# Patient Record
Sex: FEMALE | Race: OTHER | NOT HISPANIC OR LATINO | URBAN - METROPOLITAN AREA
[De-identification: names, ages, dates, MRNs, and addresses within clinical notes are randomized per-mention and may not be internally consistent; named-entity substitution may affect disease eponyms.]

---

## 2017-09-07 ENCOUNTER — EMERGENCY (EMERGENCY)
Facility: HOSPITAL | Age: 27
LOS: 1 days | Discharge: HOME | End: 2017-09-07
Attending: EMERGENCY MEDICINE
Payer: MEDICAID

## 2017-09-07 DIAGNOSIS — J02.9 ACUTE PHARYNGITIS, UNSPECIFIED: ICD-10-CM

## 2017-09-07 DIAGNOSIS — H65.93 UNSPECIFIED NONSUPPURATIVE OTITIS MEDIA, BILATERAL: ICD-10-CM

## 2017-09-07 PROCEDURE — 99284 EMERGENCY DEPT VISIT MOD MDM: CPT | Mod: 25

## 2017-09-08 VITALS
HEART RATE: 76 BPM | SYSTOLIC BLOOD PRESSURE: 125 MMHG | HEIGHT: 67 IN | RESPIRATION RATE: 18 BRPM | WEIGHT: 169.09 LBS | DIASTOLIC BLOOD PRESSURE: 78 MMHG | OXYGEN SATURATION: 100 % | TEMPERATURE: 98 F

## 2017-09-08 VITALS
HEART RATE: 72 BPM | RESPIRATION RATE: 18 BRPM | OXYGEN SATURATION: 99 % | TEMPERATURE: 99 F | SYSTOLIC BLOOD PRESSURE: 123 MMHG | DIASTOLIC BLOOD PRESSURE: 68 MMHG

## 2017-09-08 PROCEDURE — 99284 EMERGENCY DEPT VISIT MOD MDM: CPT

## 2017-09-08 RX ORDER — IBUPROFEN 200 MG
600 TABLET ORAL ONCE
Refills: 0 | Status: COMPLETED | OUTPATIENT
Start: 2017-09-08 | End: 2017-09-08

## 2017-09-08 RX ORDER — IBUPROFEN 200 MG
1 TABLET ORAL
Qty: 40 | Refills: 0
Start: 2017-09-08 | End: 2017-09-18

## 2017-09-08 RX ORDER — DEXAMETHASONE 0.5 MG/5ML
4 ELIXIR ORAL ONCE
Refills: 0 | Status: COMPLETED | OUTPATIENT
Start: 2017-09-08 | End: 2017-09-08

## 2017-09-08 RX ADMIN — Medication 600 MILLIGRAM(S): at 01:36

## 2017-09-08 RX ADMIN — Medication 1 TABLET(S): at 01:36

## 2017-09-08 RX ADMIN — Medication 4 MILLIGRAM(S): at 01:36

## 2017-09-08 NOTE — ED PROVIDER NOTE - OBJECTIVE STATEMENT
28 y/o female with no significant PMHx presents to the ED c/o bilateral throat/ear pain x 1 week. Pt notes she has been taking Amoxicillin since onset of pain for 5 days, throat got better but worsened 2 days later with painful swallowing. Pt denies fevers, cough, or any other complaints. Pt denies any sick contacts or recent travel. NKDA.

## 2017-09-08 NOTE — ED PROVIDER NOTE - MEDICAL DECISION MAKING DETAILS
28yo F presents with bl ear pain and sore throat. Exam cw OM, no evidence of strep pharyngitis. Will give augmentin, ibuprofen and decadron.

## 2017-09-08 NOTE — ED ADULT NURSE NOTE - OBJECTIVE STATEMENT
pt states that she is having sore throat since last week on anitbiotics. then her pain is back along with fever

## 2020-09-19 ENCOUNTER — RESULT REVIEW (OUTPATIENT)
Age: 30
End: 2020-09-19

## 2021-07-12 ENCOUNTER — APPOINTMENT (OUTPATIENT)
Dept: OBGYN | Facility: CLINIC | Age: 31
End: 2021-07-12
Payer: MEDICAID

## 2021-07-12 ENCOUNTER — OUTPATIENT (OUTPATIENT)
Dept: OUTPATIENT SERVICES | Facility: HOSPITAL | Age: 31
LOS: 1 days | End: 2021-07-12
Payer: COMMERCIAL

## 2021-07-12 VITALS
RESPIRATION RATE: 18 BRPM | OXYGEN SATURATION: 99 % | WEIGHT: 173 LBS | TEMPERATURE: 98.1 F | BODY MASS INDEX: 26.22 KG/M2 | DIASTOLIC BLOOD PRESSURE: 80 MMHG | HEART RATE: 67 BPM | HEIGHT: 68 IN | SYSTOLIC BLOOD PRESSURE: 123 MMHG

## 2021-07-12 DIAGNOSIS — Z00.00 ENCOUNTER FOR GENERAL ADULT MEDICAL EXAMINATION WITHOUT ABNORMAL FINDINGS: ICD-10-CM

## 2021-07-12 DIAGNOSIS — M25.561 PAIN IN RIGHT KNEE: ICD-10-CM

## 2021-07-12 DIAGNOSIS — M79.671 PAIN IN RIGHT FOOT: ICD-10-CM

## 2021-07-12 DIAGNOSIS — Z78.9 OTHER SPECIFIED HEALTH STATUS: ICD-10-CM

## 2021-07-12 DIAGNOSIS — Z83.3 FAMILY HISTORY OF DIABETES MELLITUS: ICD-10-CM

## 2021-07-12 DIAGNOSIS — M79.672 PAIN IN RIGHT FOOT: ICD-10-CM

## 2021-07-12 DIAGNOSIS — M25.562 PAIN IN RIGHT KNEE: ICD-10-CM

## 2021-07-12 PROCEDURE — 36415 COLL VENOUS BLD VENIPUNCTURE: CPT

## 2021-07-12 PROCEDURE — G0463: CPT

## 2021-07-12 PROCEDURE — 36415 COLL VENOUS BLD VENIPUNCTURE: CPT | Mod: NC

## 2021-07-12 PROCEDURE — 84146 ASSAY OF PROLACTIN: CPT

## 2021-07-12 PROCEDURE — 86803 HEPATITIS C AB TEST: CPT

## 2021-07-12 PROCEDURE — 87625 HPV TYPES 16 & 18 ONLY: CPT

## 2021-07-12 PROCEDURE — 83001 ASSAY OF GONADOTROPIN (FSH): CPT

## 2021-07-12 PROCEDURE — 83002 ASSAY OF GONADOTROPIN (LH): CPT

## 2021-07-12 PROCEDURE — 84443 ASSAY THYROID STIM HORMONE: CPT

## 2021-07-12 PROCEDURE — 87624 HPV HI-RISK TYP POOLED RSLT: CPT

## 2021-07-12 PROCEDURE — 86780 TREPONEMA PALLIDUM: CPT

## 2021-07-12 PROCEDURE — 87340 HEPATITIS B SURFACE AG IA: CPT

## 2021-07-12 PROCEDURE — 99203 OFFICE O/P NEW LOW 30 MIN: CPT | Mod: NC,25

## 2021-07-12 PROCEDURE — 87491 CHLMYD TRACH DNA AMP PROBE: CPT

## 2021-07-12 PROCEDURE — 87800 DETECT AGNT MULT DNA DIREC: CPT

## 2021-07-12 PROCEDURE — 84439 ASSAY OF FREE THYROXINE: CPT

## 2021-07-12 PROCEDURE — 87591 N.GONORRHOEAE DNA AMP PROB: CPT

## 2021-07-12 PROCEDURE — 87389 HIV-1 AG W/HIV-1&-2 AB AG IA: CPT

## 2021-07-12 RX ORDER — PRENATAL VIT 108/IRON,CB/FOLIC 30 MG-1 MG
30-1 TABLET ORAL
Qty: 30 | Refills: 5 | Status: ACTIVE | COMMUNITY
Start: 2021-07-12 | End: 1900-01-01

## 2021-07-12 NOTE — COUNSELING
[Vitamins/Supplements] : vitamins/supplements [Preconception Care/ Fertility options] : preconception care, fertility options [STD (testing, results, tx)] : STD (testing, results, tx)

## 2021-07-12 NOTE — HISTORY OF PRESENT ILLNESS
[N] : Patient does not use contraception [Y] : Patient is sexually active [FreeTextEntry1] : Patient complains of vaginal itching beige vaginal discharge with foul odor  [Patient reported PAP Smear was normal] : Patient reported PAP Smear was normal [TextBox_4] : Here for reported vaginal odor prior to her menses\par LMP  -\par reports regular cycles\par trying to conceive x 7 months, , Hx missed ab x2, 1 prior to 1st  pregnancy @ 8 wks, second was after her 2nd child\par reports cholecystectomy 2 months ago [PapSmeardate] : 2020 patient [TextBox_31] : no HPV seen [LMPDate] : 7/5/21 [PGHxTotal] : 4 [[facility]] : 2 [PGHxPremature] : 0 [PGHxAbortions] : 0 [[facility]Living] : 2 [PGHxABInduced] : 0 [PGHxABSpont] : 2 [PGHxEctopic] : 0 [PGHxMultBirths] : 0 [No] : Patient does not have concerns regarding sex [Currently Active] : currently active [Men] : men [Vaginal] : vaginal

## 2021-07-13 DIAGNOSIS — Z01.419 ENCOUNTER FOR GYNECOLOGICAL EXAMINATION (GENERAL) (ROUTINE) WITHOUT ABNORMAL FINDINGS: ICD-10-CM

## 2021-07-14 LAB
C TRACH RRNA SPEC QL NAA+PROBE: NOT DETECTED
CANDIDA VAG CYTO: NOT DETECTED
FSH SERPL-MCNC: 5 IU/L
G VAGINALIS+PREV SP MTYP VAG QL MICRO: DETECTED
HBV SURFACE AG SER QL: NONREACTIVE
HCV AB SER QL: NONREACTIVE
HCV S/CO RATIO: 0.16 S/CO
HIV1+2 AB SPEC QL IA.RAPID: NONREACTIVE
HPV HIGH+LOW RISK DNA PNL CVX: NOT DETECTED
LH SERPL-ACNC: 4.9 IU/L
N GONORRHOEA RRNA SPEC QL NAA+PROBE: NOT DETECTED
PROLACTIN SERPL-MCNC: 9.5 NG/ML
SOURCE TP AMPLIFICATION: NORMAL
T PALLIDUM AB SER QL IA: NEGATIVE
T VAGINALIS VAG QL WET PREP: NOT DETECTED
T4 FREE SERPL-MCNC: 1.1 NG/DL
TSH SERPL-ACNC: 6.14 UIU/ML

## 2021-07-14 RX ORDER — METRONIDAZOLE 7.5 MG/G
0.75 GEL VAGINAL
Qty: 1 | Refills: 0 | Status: ACTIVE | COMMUNITY
Start: 2021-07-14 | End: 1900-01-01

## 2021-07-15 ENCOUNTER — NON-APPOINTMENT (OUTPATIENT)
Age: 31
End: 2021-07-15

## 2021-07-15 LAB
HPV 16 E6+E7 MRNA CVX QL NAA+PROBE: NOT DETECTED
HPV18+45 E6+E7 MRNA CVX QL NAA+PROBE: NOT DETECTED

## 2021-07-19 LAB — CYTOLOGY CVX/VAG DOC THIN PREP: ABNORMAL

## 2021-08-02 ENCOUNTER — APPOINTMENT (OUTPATIENT)
Dept: OBGYN | Facility: CLINIC | Age: 31
End: 2021-08-02

## 2021-10-04 NOTE — ED ADULT NURSE NOTE - NS ED NURSE DC INFO COMPLEXITY
[de-identified] : fever X 4 days, coughing, nasal congestion,bilateral ear pain X 2 days [FreeTextEntry6] : afebrile now\par lots of enery, no fatigue or lethary or SOB\par clear rhinorrhea, cough, using OTC cough meds\par denies covid exposure, vomiting or diarrhea\par at home with mom. no sick contacts Simple: Patient demonstrates quick and easy understanding/Verbalized Understanding

## 2022-03-16 ENCOUNTER — APPOINTMENT (OUTPATIENT)
Dept: OBGYN | Facility: CLINIC | Age: 32
End: 2022-03-16
Payer: MEDICAID

## 2022-03-16 ENCOUNTER — OUTPATIENT (OUTPATIENT)
Dept: OUTPATIENT SERVICES | Facility: HOSPITAL | Age: 32
LOS: 1 days | End: 2022-03-16
Payer: COMMERCIAL

## 2022-03-16 VITALS
HEART RATE: 65 BPM | OXYGEN SATURATION: 100 % | HEIGHT: 68 IN | RESPIRATION RATE: 18 BRPM | TEMPERATURE: 98.2 F | BODY MASS INDEX: 25.91 KG/M2 | DIASTOLIC BLOOD PRESSURE: 70 MMHG | WEIGHT: 171 LBS | SYSTOLIC BLOOD PRESSURE: 107 MMHG

## 2022-03-16 DIAGNOSIS — Z34.00 ENCOUNTER FOR SUPERVISION OF NORMAL FIRST PREGNANCY, UNSPECIFIED TRIMESTER: ICD-10-CM

## 2022-03-16 DIAGNOSIS — Z3A.01 LESS THAN 8 WEEKS GESTATION OF PREGNANCY: ICD-10-CM

## 2022-03-16 DIAGNOSIS — Z86.32 PERSONAL HISTORY OF GESTATIONAL DIABETES: ICD-10-CM

## 2022-03-16 DIAGNOSIS — Z82.49 FAMILY HISTORY OF ISCHEMIC HEART DISEASE AND OTHER DISEASES OF THE CIRCULATORY SYSTEM: ICD-10-CM

## 2022-03-16 PROCEDURE — 81243 FMR1 GEN ALY DETC ABNL ALLEL: CPT

## 2022-03-16 PROCEDURE — 87086 URINE CULTURE/COLONY COUNT: CPT

## 2022-03-16 PROCEDURE — 83655 ASSAY OF LEAD: CPT

## 2022-03-16 PROCEDURE — 85025 COMPLETE CBC W/AUTO DIFF WBC: CPT

## 2022-03-16 PROCEDURE — 86850 RBC ANTIBODY SCREEN: CPT

## 2022-03-16 PROCEDURE — 80053 COMPREHEN METABOLIC PANEL: CPT

## 2022-03-16 PROCEDURE — 83020 HEMOGLOBIN ELECTROPHORESIS: CPT

## 2022-03-16 PROCEDURE — 36415 COLL VENOUS BLD VENIPUNCTURE: CPT | Mod: NC

## 2022-03-16 PROCEDURE — 81025 URINE PREGNANCY TEST: CPT

## 2022-03-16 PROCEDURE — 86780 TREPONEMA PALLIDUM: CPT

## 2022-03-16 PROCEDURE — 86900 BLOOD TYPING SEROLOGIC ABO: CPT

## 2022-03-16 PROCEDURE — 87340 HEPATITIS B SURFACE AG IA: CPT

## 2022-03-16 PROCEDURE — 86765 RUBEOLA ANTIBODY: CPT

## 2022-03-16 PROCEDURE — 86481 TB AG RESPONSE T-CELL SUSP: CPT

## 2022-03-16 PROCEDURE — 99203 OFFICE O/P NEW LOW 30 MIN: CPT | Mod: NC,25

## 2022-03-16 PROCEDURE — 86762 RUBELLA ANTIBODY: CPT

## 2022-03-16 PROCEDURE — G0463: CPT

## 2022-03-16 PROCEDURE — 85730 THROMBOPLASTIN TIME PARTIAL: CPT

## 2022-03-16 PROCEDURE — 86803 HEPATITIS C AB TEST: CPT

## 2022-03-16 PROCEDURE — 36415 COLL VENOUS BLD VENIPUNCTURE: CPT

## 2022-03-16 PROCEDURE — 84443 ASSAY THYROID STIM HORMONE: CPT

## 2022-03-16 PROCEDURE — 83036 HEMOGLOBIN GLYCOSYLATED A1C: CPT

## 2022-03-16 PROCEDURE — 81220 CFTR GENE COM VARIANTS: CPT

## 2022-03-16 PROCEDURE — 86787 VARICELLA-ZOSTER ANTIBODY: CPT

## 2022-03-16 PROCEDURE — 86901 BLOOD TYPING SEROLOGIC RH(D): CPT

## 2022-03-16 PROCEDURE — 81329 SMN1 GENE DOS/DELETION ALYS: CPT

## 2022-03-16 PROCEDURE — 84550 ASSAY OF BLOOD/URIC ACID: CPT

## 2022-03-16 PROCEDURE — 85384 FIBRINOGEN ACTIVITY: CPT

## 2022-03-16 PROCEDURE — 84156 ASSAY OF PROTEIN URINE: CPT

## 2022-03-16 PROCEDURE — 87389 HIV-1 AG W/HIV-1&-2 AB AG IA: CPT

## 2022-03-16 PROCEDURE — 81003 URINALYSIS AUTO W/O SCOPE: CPT

## 2022-03-16 RX ORDER — PNV NO.95/FERROUS FUM/FOLIC AC 28MG-0.8MG
TABLET ORAL
Refills: 0 | Status: ACTIVE | COMMUNITY

## 2022-03-17 DIAGNOSIS — Z3A.01 LESS THAN 8 WEEKS GESTATION OF PREGNANCY: ICD-10-CM

## 2022-03-17 DIAGNOSIS — Z34.90 ENCOUNTER FOR SUPERVISION OF NORMAL PREGNANCY, UNSPECIFIED, UNSPECIFIED TRIMESTER: ICD-10-CM

## 2022-03-17 LAB
ABO + RH PNL BLD: NORMAL
ALBUMIN SERPL ELPH-MCNC: 4.7 G/DL
ALP BLD-CCNC: 77 U/L
ALT SERPL-CCNC: 14 U/L
ANION GAP SERPL CALC-SCNC: 19 MMOL/L
APTT BLD: 26.7 SEC
AST SERPL-CCNC: 14 U/L
BASOPHILS # BLD AUTO: 0.03 K/UL
BASOPHILS NFR BLD AUTO: 0.7 %
BILIRUB SERPL-MCNC: 0.6 MG/DL
BLD GP AB SCN SERPL QL: NORMAL
BUN SERPL-MCNC: 9 MG/DL
C TRACH RRNA SPEC QL NAA+PROBE: NOT DETECTED
CALCIUM SERPL-MCNC: 9.3 MG/DL
CHLORIDE SERPL-SCNC: 106 MMOL/L
CO2 SERPL-SCNC: 16 MMOL/L
CREAT SERPL-MCNC: 0.74 MG/DL
CREAT SPEC-SCNC: 30 MG/DL
CREAT/PROT UR: NORMAL RATIO
EGFR: 109 ML/MIN/1.73M2
EOSINOPHIL # BLD AUTO: 0.04 K/UL
EOSINOPHIL NFR BLD AUTO: 0.9 %
ESTIMATED AVERAGE GLUCOSE: 103 MG/DL
FIBRINOGEN PPP COAG.DERIVED-MCNC: 472 MG/DL
GLUCOSE SERPL-MCNC: 105 MG/DL
HBA1C MFR BLD HPLC: 5.2 %
HBV SURFACE AG SER QL: NONREACTIVE
HCT VFR BLD CALC: 37.6 %
HCV AB SER QL: NONREACTIVE
HCV S/CO RATIO: 0.17 S/CO
HGB A MFR BLD: 97.2 %
HGB A2 MFR BLD: 2.8 %
HGB BLD-MCNC: 13 G/DL
HGB FRACT BLD-IMP: NORMAL
HIV1+2 AB SPEC QL IA.RAPID: NONREACTIVE
IMM GRANULOCYTES NFR BLD AUTO: 0.2 %
LYMPHOCYTES # BLD AUTO: 1.84 K/UL
LYMPHOCYTES NFR BLD AUTO: 41 %
MAN DIFF?: NORMAL
MCHC RBC-ENTMCNC: 30.7 PG
MCHC RBC-ENTMCNC: 34.6 GM/DL
MCV RBC AUTO: 88.9 FL
MEV IGG FLD QL IA: >300 AU/ML
MEV IGG+IGM SER-IMP: POSITIVE
MONOCYTES # BLD AUTO: 0.28 K/UL
MONOCYTES NFR BLD AUTO: 6.2 %
N GONORRHOEA RRNA SPEC QL NAA+PROBE: NOT DETECTED
NEUTROPHILS # BLD AUTO: 2.29 K/UL
NEUTROPHILS NFR BLD AUTO: 51 %
PLATELET # BLD AUTO: 258 K/UL
POTASSIUM SERPL-SCNC: 3.6 MMOL/L
PROT SERPL-MCNC: 7.1 G/DL
PROT UR-MCNC: <4 MG/DL
RBC # BLD: 4.23 M/UL
RBC # FLD: 13.2 %
RUBV IGG FLD-ACNC: 3 INDEX
RUBV IGG SER-IMP: POSITIVE
SODIUM SERPL-SCNC: 140 MMOL/L
SOURCE AMPLIFICATION: NORMAL
T PALLIDUM AB SER QL IA: NEGATIVE
URATE SERPL-MCNC: 4.5 MG/DL
VZV AB TITR SER: POSITIVE
VZV IGG SER IF-ACNC: 647.3 INDEX
WBC # FLD AUTO: 4.49 K/UL

## 2022-03-18 LAB
BACTERIA UR CULT: NORMAL
LEAD BLD-MCNC: <1 UG/DL
M TB IFN-G BLD-IMP: NEGATIVE
QUANTIFERON TB PLUS MITOGEN MINUS NIL: 9.85 IU/ML
QUANTIFERON TB PLUS NIL: 0.15 IU/ML
QUANTIFERON TB PLUS TB1 MINUS NIL: 0.08 IU/ML
QUANTIFERON TB PLUS TB2 MINUS NIL: 0.04 IU/ML

## 2022-03-23 LAB — FMR1 GENE MUT ANL BLD/T: NORMAL

## 2022-03-24 LAB — CFTR MUT TESTED BLD/T: NEGATIVE

## 2022-03-29 LAB — AR GENE MUT ANL BLD/T: NORMAL

## 2022-03-31 ENCOUNTER — NON-APPOINTMENT (OUTPATIENT)
Age: 32
End: 2022-03-31

## 2022-04-01 ENCOUNTER — OUTPATIENT (OUTPATIENT)
Dept: OUTPATIENT SERVICES | Facility: HOSPITAL | Age: 32
LOS: 1 days | End: 2022-04-01
Payer: COMMERCIAL

## 2022-04-01 DIAGNOSIS — R79.89 OTHER SPECIFIED ABNORMAL FINDINGS OF BLOOD CHEMISTRY: ICD-10-CM

## 2022-04-01 DIAGNOSIS — Z34.90 ENCOUNTER FOR SUPERVISION OF NORMAL PREGNANCY, UNSPECIFIED, UNSPECIFIED TRIMESTER: ICD-10-CM

## 2022-04-01 PROCEDURE — 84445 ASSAY OF TSI GLOBULIN: CPT

## 2022-04-01 PROCEDURE — 84439 ASSAY OF FREE THYROXINE: CPT

## 2022-04-01 PROCEDURE — 83520 IMMUNOASSAY QUANT NOS NONAB: CPT

## 2022-04-01 PROCEDURE — 86376 MICROSOMAL ANTIBODY EACH: CPT

## 2022-04-01 PROCEDURE — 84443 ASSAY THYROID STIM HORMONE: CPT

## 2022-04-04 LAB
T4 FREE SERPL-MCNC: 1.1 NG/DL
THYROPEROXIDASE AB SERPL IA-ACNC: <10 IU/ML
TSH RECEPTOR AB: <1.1 IU/L
TSH SERPL-ACNC: 5.19 UIU/ML

## 2022-04-06 ENCOUNTER — NON-APPOINTMENT (OUTPATIENT)
Age: 32
End: 2022-04-06

## 2022-04-06 LAB — TSH SERPL-ACNC: 5.23 UIU/ML

## 2022-04-21 ENCOUNTER — NON-APPOINTMENT (OUTPATIENT)
Age: 32
End: 2022-04-21

## 2022-04-21 ENCOUNTER — APPOINTMENT (OUTPATIENT)
Dept: OBGYN | Facility: CLINIC | Age: 32
End: 2022-04-21
Payer: MEDICAID

## 2022-04-21 ENCOUNTER — ASOB RESULT (OUTPATIENT)
Age: 32
End: 2022-04-21

## 2022-04-21 ENCOUNTER — OUTPATIENT (OUTPATIENT)
Dept: OUTPATIENT SERVICES | Facility: HOSPITAL | Age: 32
LOS: 1 days | End: 2022-04-21
Payer: COMMERCIAL

## 2022-04-21 ENCOUNTER — APPOINTMENT (OUTPATIENT)
Dept: ANTEPARTUM | Facility: CLINIC | Age: 32
End: 2022-04-21
Payer: MEDICAID

## 2022-04-21 VITALS
TEMPERATURE: 97.7 F | BODY MASS INDEX: 25.61 KG/M2 | SYSTOLIC BLOOD PRESSURE: 118 MMHG | HEART RATE: 70 BPM | OXYGEN SATURATION: 99 % | DIASTOLIC BLOOD PRESSURE: 70 MMHG | HEIGHT: 68 IN | WEIGHT: 169 LBS

## 2022-04-21 DIAGNOSIS — Z34.00 ENCOUNTER FOR SUPERVISION OF NORMAL FIRST PREGNANCY, UNSPECIFIED TRIMESTER: ICD-10-CM

## 2022-04-21 LAB — TSI ACT/NOR SER: <0.1 IU/L

## 2022-04-21 PROCEDURE — 81003 URINALYSIS AUTO W/O SCOPE: CPT

## 2022-04-21 PROCEDURE — G0463: CPT

## 2022-04-21 PROCEDURE — 99213 OFFICE O/P EST LOW 20 MIN: CPT | Mod: NC,25

## 2022-04-21 PROCEDURE — 76813 OB US NUCHAL MEAS 1 GEST: CPT | Mod: 59

## 2022-04-21 PROCEDURE — 76801 OB US < 14 WKS SINGLE FETUS: CPT

## 2022-04-21 RX ORDER — ASPIRIN 81 MG/1
81 TABLET, CHEWABLE ORAL DAILY
Qty: 1 | Refills: 6 | Status: ACTIVE | COMMUNITY
Start: 2022-04-21 | End: 1900-01-01

## 2022-04-28 DIAGNOSIS — Z34.91 ENCOUNTER FOR SUPERVISION OF NORMAL PREGNANCY, UNSPECIFIED, FIRST TRIMESTER: ICD-10-CM

## 2022-04-28 DIAGNOSIS — Z3A.12 12 WEEKS GESTATION OF PREGNANCY: ICD-10-CM

## 2022-05-18 ENCOUNTER — APPOINTMENT (OUTPATIENT)
Dept: OBGYN | Facility: CLINIC | Age: 32
End: 2022-05-18
Payer: MEDICAID

## 2022-05-18 ENCOUNTER — OUTPATIENT (OUTPATIENT)
Dept: OUTPATIENT SERVICES | Facility: HOSPITAL | Age: 32
LOS: 1 days | End: 2022-05-18
Payer: COMMERCIAL

## 2022-05-18 VITALS
HEIGHT: 68 IN | TEMPERATURE: 97.4 F | HEART RATE: 85 BPM | WEIGHT: 170 LBS | BODY MASS INDEX: 25.76 KG/M2 | DIASTOLIC BLOOD PRESSURE: 82 MMHG | RESPIRATION RATE: 18 BRPM | SYSTOLIC BLOOD PRESSURE: 122 MMHG | OXYGEN SATURATION: 100 %

## 2022-05-18 DIAGNOSIS — R05.9 COUGH, UNSPECIFIED: ICD-10-CM

## 2022-05-18 DIAGNOSIS — J30.89 OTHER ALLERGIC RHINITIS: ICD-10-CM

## 2022-05-18 DIAGNOSIS — Z34.00 ENCOUNTER FOR SUPERVISION OF NORMAL FIRST PREGNANCY, UNSPECIFIED TRIMESTER: ICD-10-CM

## 2022-05-18 DIAGNOSIS — Z34.92 ENCOUNTER FOR SUPERVISION OF NORMAL PREGNANCY, UNSPECIFIED, SECOND TRIMESTER: ICD-10-CM

## 2022-05-18 DIAGNOSIS — Z3A.16 16 WEEKS GESTATION OF PREGNANCY: ICD-10-CM

## 2022-05-18 PROCEDURE — 36415 COLL VENOUS BLD VENIPUNCTURE: CPT | Mod: NC

## 2022-05-18 PROCEDURE — 99213 OFFICE O/P EST LOW 20 MIN: CPT | Mod: NC,25

## 2022-05-18 PROCEDURE — 87651 STREP A DNA AMP PROBE: CPT

## 2022-05-18 PROCEDURE — U0005: CPT

## 2022-05-18 PROCEDURE — 85025 COMPLETE CBC W/AUTO DIFF WBC: CPT

## 2022-05-18 PROCEDURE — 87086 URINE CULTURE/COLONY COUNT: CPT

## 2022-05-18 PROCEDURE — U0003: CPT

## 2022-05-18 PROCEDURE — G0463: CPT

## 2022-05-18 PROCEDURE — 82950 GLUCOSE TEST: CPT

## 2022-05-18 PROCEDURE — 81003 URINALYSIS AUTO W/O SCOPE: CPT

## 2022-05-18 PROCEDURE — 86336 INHIBIN A: CPT

## 2022-05-18 PROCEDURE — 84443 ASSAY THYROID STIM HORMONE: CPT

## 2022-05-18 PROCEDURE — 36415 COLL VENOUS BLD VENIPUNCTURE: CPT

## 2022-05-18 RX ORDER — FLUTICASONE PROPIONATE 50 UG/1
50 SPRAY, METERED NASAL TWICE DAILY
Qty: 1 | Refills: 0 | Status: ACTIVE | COMMUNITY
Start: 2022-05-18 | End: 1900-01-01

## 2022-05-18 RX ORDER — OXYMETAZOLINE HCL 0.05 %
0.05 SPRAY, NON-AEROSOL (ML) NASAL TWICE DAILY
Qty: 1 | Refills: 0 | Status: ACTIVE | COMMUNITY
Start: 2022-05-18 | End: 1900-01-01

## 2022-05-18 RX ORDER — GUAIFENESIN 100 MG/5ML
100 LIQUID ORAL EVERY 4 HOURS
Qty: 1 | Refills: 2 | Status: ACTIVE | COMMUNITY
Start: 2022-05-18 | End: 1900-01-01

## 2022-05-19 LAB
BASOPHILS # BLD AUTO: 0.03 K/UL
BASOPHILS NFR BLD AUTO: 0.6 %
EOSINOPHIL # BLD AUTO: 0.17 K/UL
EOSINOPHIL NFR BLD AUTO: 3.4 %
GLUCOSE 1H P 50 G GLC PO SERPL-MCNC: 272 MG/DL
HCT VFR BLD CALC: 35.1 %
HGB BLD-MCNC: 12.2 G/DL
IMM GRANULOCYTES NFR BLD AUTO: 0.6 %
LYMPHOCYTES # BLD AUTO: 1.28 K/UL
LYMPHOCYTES NFR BLD AUTO: 25.5 %
MAN DIFF?: NORMAL
MCHC RBC-ENTMCNC: 31.7 PG
MCHC RBC-ENTMCNC: 34.8 GM/DL
MCV RBC AUTO: 91.2 FL
MONOCYTES # BLD AUTO: 0.23 K/UL
MONOCYTES NFR BLD AUTO: 4.6 %
NEUTROPHILS # BLD AUTO: 3.27 K/UL
NEUTROPHILS NFR BLD AUTO: 65.3 %
PLATELET # BLD AUTO: 233 K/UL
RBC # BLD: 3.85 M/UL
RBC # FLD: 13.8 %
S PYO DNA THROAT QL NAA+PROBE: NOT DETECTED
SARS-COV-2 N GENE NPH QL NAA+PROBE: NOT DETECTED
TSH SERPL-ACNC: 3.2 UIU/ML
WBC # FLD AUTO: 5.01 K/UL

## 2022-05-20 ENCOUNTER — NON-APPOINTMENT (OUTPATIENT)
Age: 32
End: 2022-05-20

## 2022-05-24 ENCOUNTER — RX RENEWAL (OUTPATIENT)
Age: 32
End: 2022-05-24

## 2022-05-24 LAB — BACTERIA UR CULT: NORMAL

## 2022-05-26 LAB
1ST TRIMESTER DATA: NORMAL
2ND TRIMESTER DATA: NORMAL
ADDENDUM DOC: NORMAL
AFP PNL SERPL: NORMAL
AFP SERPL-ACNC: NORMAL
AFP SERPL-ACNC: NORMAL
B-HCG FREE SERPL-MCNC: NORMAL
CLINICAL BIOCHEMIST REVIEW: NORMAL
FREE BETA HCG 1ST TRIMESTER: NORMAL
INHIBIN A SERPL-MCNC: NORMAL
NOTES NTD: NORMAL
NT: NORMAL
PAPP-A SERPL-ACNC: NORMAL
U ESTRIOL SERPL-SCNC: NORMAL

## 2022-06-13 ENCOUNTER — ASOB RESULT (OUTPATIENT)
Age: 32
End: 2022-06-13

## 2022-06-13 ENCOUNTER — APPOINTMENT (OUTPATIENT)
Dept: MATERNAL FETAL MEDICINE | Facility: CLINIC | Age: 32
End: 2022-06-13
Payer: MEDICAID

## 2022-06-13 PROCEDURE — G0109 DIAB MANAGE TRN IND/GROUP: CPT | Mod: 95

## 2022-06-13 RX ORDER — URINE ACETONE TEST STRIPS
STRIP MISCELLANEOUS
Qty: 50 | Refills: 2 | Status: ACTIVE | COMMUNITY
Start: 2022-06-13 | End: 1900-01-01

## 2022-06-13 RX ORDER — BLOOD-GLUCOSE METER
W/DEVICE KIT MISCELLANEOUS
Qty: 1 | Refills: 0 | Status: ACTIVE | COMMUNITY
Start: 2022-06-13 | End: 1900-01-01

## 2022-06-16 ENCOUNTER — APPOINTMENT (OUTPATIENT)
Dept: ANTEPARTUM | Facility: CLINIC | Age: 32
End: 2022-06-16
Payer: MEDICAID

## 2022-06-16 ENCOUNTER — ASOB RESULT (OUTPATIENT)
Age: 32
End: 2022-06-16

## 2022-06-16 ENCOUNTER — OUTPATIENT (OUTPATIENT)
Dept: OUTPATIENT SERVICES | Facility: HOSPITAL | Age: 32
LOS: 1 days | End: 2022-06-16
Payer: COMMERCIAL

## 2022-06-16 ENCOUNTER — APPOINTMENT (OUTPATIENT)
Dept: OBGYN | Facility: CLINIC | Age: 32
End: 2022-06-16
Payer: MEDICAID

## 2022-06-16 VITALS
DIASTOLIC BLOOD PRESSURE: 78 MMHG | WEIGHT: 178 LBS | HEIGHT: 68 IN | BODY MASS INDEX: 26.98 KG/M2 | SYSTOLIC BLOOD PRESSURE: 124 MMHG | TEMPERATURE: 97.7 F | RESPIRATION RATE: 18 BRPM | OXYGEN SATURATION: 100 % | HEART RATE: 74 BPM

## 2022-06-16 DIAGNOSIS — Z01.419 ENCOUNTER FOR GYNECOLOGICAL EXAMINATION (GENERAL) (ROUTINE) W/OUT ABNORMAL FINDINGS: ICD-10-CM

## 2022-06-16 DIAGNOSIS — Z34.00 ENCOUNTER FOR SUPERVISION OF NORMAL FIRST PREGNANCY, UNSPECIFIED TRIMESTER: ICD-10-CM

## 2022-06-16 DIAGNOSIS — O24.319 UNSPECIFIED PRE-EXISTING DIABETES MELLITUS IN PREGNANCY, UNSPECIFIED TRIMESTER: ICD-10-CM

## 2022-06-16 PROCEDURE — 76811 OB US DETAILED SNGL FETUS: CPT

## 2022-06-16 PROCEDURE — 81003 URINALYSIS AUTO W/O SCOPE: CPT

## 2022-06-16 PROCEDURE — G0463: CPT

## 2022-06-16 PROCEDURE — 87086 URINE CULTURE/COLONY COUNT: CPT

## 2022-06-16 PROCEDURE — 99213 OFFICE O/P EST LOW 20 MIN: CPT

## 2022-06-16 RX ORDER — BLOOD SUGAR DIAGNOSTIC
STRIP MISCELLANEOUS 4 TIMES DAILY
Qty: 1 | Refills: 1 | Status: ACTIVE | COMMUNITY
Start: 2022-05-19 | End: 1900-01-01

## 2022-06-16 RX ORDER — BLOOD-GLUCOSE METER
KIT MISCELLANEOUS
Qty: 1 | Refills: 0 | Status: ACTIVE | COMMUNITY
Start: 2022-05-19 | End: 1900-01-01

## 2022-06-16 RX ORDER — LANCETS 28 GAUGE
EACH MISCELLANEOUS
Qty: 120 | Refills: 4 | Status: ACTIVE | COMMUNITY
Start: 2022-05-19 | End: 1900-01-01

## 2022-06-21 DIAGNOSIS — O26.899 OTHER SPECIFIED PREGNANCY RELATED CONDITIONS, UNSPECIFIED TRIMESTER: ICD-10-CM

## 2022-06-21 DIAGNOSIS — Z34.90 ENCOUNTER FOR SUPERVISION OF NORMAL PREGNANCY, UNSPECIFIED, UNSPECIFIED TRIMESTER: ICD-10-CM

## 2022-06-21 DIAGNOSIS — O24.419 GESTATIONAL DIABETES MELLITUS IN PREGNANCY, UNSPECIFIED CONTROL: ICD-10-CM

## 2022-06-21 DIAGNOSIS — O09.299 SUPERVISION OF PREGNANCY WITH OTHER POOR REPRODUCTIVE OR OBSTETRIC HISTORY, UNSPECIFIED TRIMESTER: ICD-10-CM

## 2022-06-21 LAB — BACTERIA UR CULT: NORMAL

## 2022-06-22 ENCOUNTER — TRANSCRIPTION ENCOUNTER (OUTPATIENT)
Age: 32
End: 2022-06-22

## 2022-06-22 ENCOUNTER — ASOB RESULT (OUTPATIENT)
Age: 32
End: 2022-06-22

## 2022-06-22 ENCOUNTER — APPOINTMENT (OUTPATIENT)
Dept: MATERNAL FETAL MEDICINE | Facility: CLINIC | Age: 32
End: 2022-06-22
Payer: MEDICAID

## 2022-06-22 PROCEDURE — G0108 DIAB MANAGE TRN  PER INDIV: CPT | Mod: 95

## 2022-06-22 RX ORDER — ISOPROPYL ALCOHOL 0.7 ML/ML
SWAB TOPICAL
Qty: 1 | Refills: 2 | Status: ACTIVE | COMMUNITY
Start: 2022-06-22 | End: 1900-01-01

## 2022-06-27 ENCOUNTER — NON-APPOINTMENT (OUTPATIENT)
Age: 32
End: 2022-06-27

## 2022-06-28 ENCOUNTER — APPOINTMENT (OUTPATIENT)
Dept: MATERNAL FETAL MEDICINE | Facility: CLINIC | Age: 32
End: 2022-06-28

## 2022-06-28 ENCOUNTER — ASOB RESULT (OUTPATIENT)
Age: 32
End: 2022-06-28

## 2022-06-28 PROCEDURE — G0108 DIAB MANAGE TRN  PER INDIV: CPT | Mod: 95

## 2022-07-07 ENCOUNTER — ASOB RESULT (OUTPATIENT)
Age: 32
End: 2022-07-07

## 2022-07-07 ENCOUNTER — APPOINTMENT (OUTPATIENT)
Dept: MATERNAL FETAL MEDICINE | Facility: CLINIC | Age: 32
End: 2022-07-07

## 2022-07-07 PROCEDURE — G0108 DIAB MANAGE TRN  PER INDIV: CPT | Mod: 95

## 2022-07-08 ENCOUNTER — APPOINTMENT (OUTPATIENT)
Dept: PEDIATRIC CARDIOLOGY | Facility: CLINIC | Age: 32
End: 2022-07-08

## 2022-07-08 PROCEDURE — 76827 ECHO EXAM OF FETAL HEART: CPT

## 2022-07-08 PROCEDURE — 76825 ECHO EXAM OF FETAL HEART: CPT

## 2022-07-08 PROCEDURE — 99241 OFFICE CONSULTATION NEW/ESTAB PATIENT 15 MIN: CPT | Mod: 25

## 2022-07-08 PROCEDURE — 93325 DOPPLER ECHO COLOR FLOW MAPG: CPT | Mod: 59

## 2022-07-08 PROCEDURE — 76820 UMBILICAL ARTERY ECHO: CPT

## 2022-07-14 ENCOUNTER — APPOINTMENT (OUTPATIENT)
Dept: ANTEPARTUM | Facility: CLINIC | Age: 32
End: 2022-07-14

## 2022-07-19 ENCOUNTER — RX RENEWAL (OUTPATIENT)
Age: 32
End: 2022-07-19

## 2022-07-19 RX ORDER — LEVOTHYROXINE SODIUM 0.03 MG/1
25 TABLET ORAL DAILY
Qty: 30 | Refills: 0 | Status: ACTIVE | COMMUNITY
Start: 2022-04-21 | End: 1900-01-01

## 2022-07-20 ENCOUNTER — NON-APPOINTMENT (OUTPATIENT)
Age: 32
End: 2022-07-20

## 2022-07-20 ENCOUNTER — OUTPATIENT (OUTPATIENT)
Dept: OUTPATIENT SERVICES | Facility: HOSPITAL | Age: 32
LOS: 1 days | End: 2022-07-20
Payer: COMMERCIAL

## 2022-07-20 ENCOUNTER — APPOINTMENT (OUTPATIENT)
Dept: OBGYN | Facility: CLINIC | Age: 32
End: 2022-07-20

## 2022-07-20 VITALS
RESPIRATION RATE: 18 BRPM | HEART RATE: 91 BPM | BODY MASS INDEX: 28.04 KG/M2 | WEIGHT: 185 LBS | DIASTOLIC BLOOD PRESSURE: 74 MMHG | TEMPERATURE: 97 F | SYSTOLIC BLOOD PRESSURE: 109 MMHG | OXYGEN SATURATION: 99 % | HEIGHT: 68 IN

## 2022-07-20 DIAGNOSIS — Z34.00 ENCOUNTER FOR SUPERVISION OF NORMAL FIRST PREGNANCY, UNSPECIFIED TRIMESTER: ICD-10-CM

## 2022-07-20 PROCEDURE — 99213 OFFICE O/P EST LOW 20 MIN: CPT

## 2022-07-20 PROCEDURE — 80053 COMPREHEN METABOLIC PANEL: CPT

## 2022-07-20 PROCEDURE — 36415 COLL VENOUS BLD VENIPUNCTURE: CPT

## 2022-07-20 PROCEDURE — 84443 ASSAY THYROID STIM HORMONE: CPT

## 2022-07-20 PROCEDURE — 81003 URINALYSIS AUTO W/O SCOPE: CPT

## 2022-07-20 PROCEDURE — G0463: CPT

## 2022-07-21 ENCOUNTER — ASOB RESULT (OUTPATIENT)
Age: 32
End: 2022-07-21

## 2022-07-21 ENCOUNTER — APPOINTMENT (OUTPATIENT)
Dept: MATERNAL FETAL MEDICINE | Facility: CLINIC | Age: 32
End: 2022-07-21

## 2022-07-21 DIAGNOSIS — Z3A.25 25 WEEKS GESTATION OF PREGNANCY: ICD-10-CM

## 2022-07-21 DIAGNOSIS — Z34.92 ENCOUNTER FOR SUPERVISION OF NORMAL PREGNANCY, UNSPECIFIED, SECOND TRIMESTER: ICD-10-CM

## 2022-07-21 LAB
ALBUMIN SERPL ELPH-MCNC: 3.8 G/DL
ALP BLD-CCNC: 91 U/L
ALT SERPL-CCNC: 18 U/L
ANION GAP SERPL CALC-SCNC: 12 MMOL/L
AST SERPL-CCNC: 15 U/L
BILIRUB SERPL-MCNC: 0.2 MG/DL
BUN SERPL-MCNC: 6 MG/DL
CALCIUM SERPL-MCNC: 9 MG/DL
CHLORIDE SERPL-SCNC: 107 MMOL/L
CO2 SERPL-SCNC: 18 MMOL/L
CREAT SERPL-MCNC: 0.58 MG/DL
EGFR: 122 ML/MIN/1.73M2
GLUCOSE SERPL-MCNC: 140 MG/DL
POTASSIUM SERPL-SCNC: 3.7 MMOL/L
PROT SERPL-MCNC: 6.3 G/DL
SODIUM SERPL-SCNC: 137 MMOL/L
TSH SERPL-ACNC: 2.8 UIU/ML

## 2022-07-21 PROCEDURE — G0108 DIAB MANAGE TRN  PER INDIV: CPT | Mod: 95

## 2022-07-27 ENCOUNTER — ASOB RESULT (OUTPATIENT)
Age: 32
End: 2022-07-27

## 2022-07-27 ENCOUNTER — APPOINTMENT (OUTPATIENT)
Dept: MATERNAL FETAL MEDICINE | Facility: CLINIC | Age: 32
End: 2022-07-27

## 2022-07-27 PROCEDURE — G0108 DIAB MANAGE TRN  PER INDIV: CPT | Mod: 95

## 2022-08-08 ENCOUNTER — ASOB RESULT (OUTPATIENT)
Age: 32
End: 2022-08-08

## 2022-08-09 ENCOUNTER — APPOINTMENT (OUTPATIENT)
Dept: MATERNAL FETAL MEDICINE | Facility: CLINIC | Age: 32
End: 2022-08-09

## 2022-08-09 PROCEDURE — G0108 DIAB MANAGE TRN  PER INDIV: CPT | Mod: 95

## 2022-08-10 ENCOUNTER — OUTPATIENT (OUTPATIENT)
Dept: OUTPATIENT SERVICES | Facility: HOSPITAL | Age: 32
LOS: 1 days | End: 2022-08-10
Payer: COMMERCIAL

## 2022-08-10 ENCOUNTER — NON-APPOINTMENT (OUTPATIENT)
Age: 32
End: 2022-08-10

## 2022-08-10 ENCOUNTER — APPOINTMENT (OUTPATIENT)
Dept: OBGYN | Facility: CLINIC | Age: 32
End: 2022-08-10

## 2022-08-10 VITALS
SYSTOLIC BLOOD PRESSURE: 118 MMHG | HEIGHT: 68 IN | HEART RATE: 96 BPM | TEMPERATURE: 100 F | DIASTOLIC BLOOD PRESSURE: 70 MMHG | WEIGHT: 186 LBS | OXYGEN SATURATION: 99 % | BODY MASS INDEX: 28.19 KG/M2

## 2022-08-10 DIAGNOSIS — Z23 ENCOUNTER FOR IMMUNIZATION: ICD-10-CM

## 2022-08-10 DIAGNOSIS — Z34.00 ENCOUNTER FOR SUPERVISION OF NORMAL FIRST PREGNANCY, UNSPECIFIED TRIMESTER: ICD-10-CM

## 2022-08-10 PROCEDURE — G0463: CPT

## 2022-08-10 PROCEDURE — 36415 COLL VENOUS BLD VENIPUNCTURE: CPT | Mod: NC

## 2022-08-10 PROCEDURE — 85025 COMPLETE CBC W/AUTO DIFF WBC: CPT

## 2022-08-10 PROCEDURE — 86901 BLOOD TYPING SEROLOGIC RH(D): CPT

## 2022-08-10 PROCEDURE — 87800 DETECT AGNT MULT DNA DIREC: CPT

## 2022-08-10 PROCEDURE — 36415 COLL VENOUS BLD VENIPUNCTURE: CPT

## 2022-08-10 PROCEDURE — 99213 OFFICE O/P EST LOW 20 MIN: CPT

## 2022-08-10 PROCEDURE — 86900 BLOOD TYPING SEROLOGIC ABO: CPT

## 2022-08-10 PROCEDURE — 86850 RBC ANTIBODY SCREEN: CPT

## 2022-08-10 PROCEDURE — 86780 TREPONEMA PALLIDUM: CPT

## 2022-08-10 PROCEDURE — 90384 RH IG FULL-DOSE IM: CPT

## 2022-08-11 LAB
ABO + RH PNL BLD: NORMAL
BASOPHILS # BLD AUTO: 0.03 K/UL
BASOPHILS NFR BLD AUTO: 0.5 %
BLD GP AB SCN SERPL QL: NORMAL
EOSINOPHIL # BLD AUTO: 0.08 K/UL
EOSINOPHIL NFR BLD AUTO: 1.2 %
HCT VFR BLD CALC: 31.1 %
HGB BLD-MCNC: 10.9 G/DL
IMM GRANULOCYTES NFR BLD AUTO: 0.8 %
LYMPHOCYTES # BLD AUTO: 1.56 K/UL
LYMPHOCYTES NFR BLD AUTO: 24.2 %
MAN DIFF?: NORMAL
MCHC RBC-ENTMCNC: 32.5 PG
MCHC RBC-ENTMCNC: 35 GM/DL
MCV RBC AUTO: 92.8 FL
MONOCYTES # BLD AUTO: 0.45 K/UL
MONOCYTES NFR BLD AUTO: 7 %
NEUTROPHILS # BLD AUTO: 4.28 K/UL
NEUTROPHILS NFR BLD AUTO: 66.3 %
PLATELET # BLD AUTO: 236 K/UL
RBC # BLD: 3.35 M/UL
RBC # FLD: 14.2 %
T PALLIDUM AB SER QL IA: NEGATIVE
WBC # FLD AUTO: 6.45 K/UL

## 2022-08-12 ENCOUNTER — NON-APPOINTMENT (OUTPATIENT)
Age: 32
End: 2022-08-12

## 2022-08-12 DIAGNOSIS — B96.89 ACUTE VAGINITIS: ICD-10-CM

## 2022-08-12 DIAGNOSIS — Z3A.28 28 WEEKS GESTATION OF PREGNANCY: ICD-10-CM

## 2022-08-12 DIAGNOSIS — Z34.93 ENCOUNTER FOR SUPERVISION OF NORMAL PREGNANCY, UNSPECIFIED, THIRD TRIMESTER: ICD-10-CM

## 2022-08-12 DIAGNOSIS — N76.0 ACUTE VAGINITIS: ICD-10-CM

## 2022-08-12 LAB
CANDIDA VAG CYTO: NOT DETECTED
G VAGINALIS+PREV SP MTYP VAG QL MICRO: DETECTED
T VAGINALIS VAG QL WET PREP: NOT DETECTED

## 2022-08-25 ENCOUNTER — APPOINTMENT (OUTPATIENT)
Dept: OBGYN | Facility: CLINIC | Age: 32
End: 2022-08-25

## 2022-08-25 ENCOUNTER — ASOB RESULT (OUTPATIENT)
Age: 32
End: 2022-08-25

## 2022-08-25 ENCOUNTER — APPOINTMENT (OUTPATIENT)
Dept: ANTEPARTUM | Facility: CLINIC | Age: 32
End: 2022-08-25

## 2022-08-25 ENCOUNTER — OUTPATIENT (OUTPATIENT)
Dept: OUTPATIENT SERVICES | Facility: HOSPITAL | Age: 32
LOS: 1 days | End: 2022-08-25
Payer: COMMERCIAL

## 2022-08-25 VITALS
OXYGEN SATURATION: 100 % | HEART RATE: 88 BPM | TEMPERATURE: 97.5 F | BODY MASS INDEX: 28.34 KG/M2 | SYSTOLIC BLOOD PRESSURE: 112 MMHG | HEIGHT: 68 IN | DIASTOLIC BLOOD PRESSURE: 70 MMHG | WEIGHT: 187 LBS

## 2022-08-25 DIAGNOSIS — Z34.00 ENCOUNTER FOR SUPERVISION OF NORMAL FIRST PREGNANCY, UNSPECIFIED TRIMESTER: ICD-10-CM

## 2022-08-25 PROCEDURE — 76816 OB US FOLLOW-UP PER FETUS: CPT

## 2022-08-25 PROCEDURE — 81003 URINALYSIS AUTO W/O SCOPE: CPT

## 2022-08-25 PROCEDURE — G0463: CPT

## 2022-08-25 PROCEDURE — 99213 OFFICE O/P EST LOW 20 MIN: CPT

## 2022-08-26 DIAGNOSIS — Z34.93 ENCOUNTER FOR SUPERVISION OF NORMAL PREGNANCY, UNSPECIFIED, THIRD TRIMESTER: ICD-10-CM

## 2022-08-26 DIAGNOSIS — Z3A.30 30 WEEKS GESTATION OF PREGNANCY: ICD-10-CM

## 2022-08-26 RX ORDER — HUMAN INSULIN 100 [IU]/ML
100 INJECTION, SUSPENSION SUBCUTANEOUS AT BEDTIME
Qty: 1 | Refills: 0 | Status: ACTIVE | COMMUNITY
Start: 2022-06-22 | End: 1900-01-01

## 2022-08-26 RX ORDER — BLOOD-GLUCOSE METER
70 EACH MISCELLANEOUS
Qty: 1 | Refills: 1 | Status: ACTIVE | COMMUNITY
Start: 2022-05-19 | End: 1900-01-01

## 2022-08-26 RX ORDER — BLOOD-GLUCOSE METER
KIT MISCELLANEOUS 4 TIMES DAILY
Qty: 4 | Refills: 2 | Status: ACTIVE | COMMUNITY
Start: 2022-06-13 | End: 1900-01-01

## 2022-08-26 RX ORDER — PEN NEEDLE, DIABETIC 32GX 5/32"
32G X 4 MM NEEDLE, DISPOSABLE MISCELLANEOUS
Qty: 1 | Refills: 0 | Status: ACTIVE | COMMUNITY
Start: 2022-06-22 | End: 1900-01-01

## 2022-08-26 RX ORDER — LANCETS 33 GAUGE
EACH MISCELLANEOUS
Qty: 4 | Refills: 2 | Status: ACTIVE | COMMUNITY
Start: 2022-06-13 | End: 1900-01-01

## 2022-08-26 RX ORDER — INSULIN LISPRO 100 U/ML
100 INJECTION, SOLUTION SUBCUTANEOUS
Qty: 1 | Refills: 0 | Status: ACTIVE | COMMUNITY
Start: 2022-06-22 | End: 1900-01-01

## 2022-09-01 ENCOUNTER — APPOINTMENT (OUTPATIENT)
Dept: MATERNAL FETAL MEDICINE | Facility: CLINIC | Age: 32
End: 2022-09-01
Payer: MEDICAID

## 2022-09-01 ENCOUNTER — ASOB RESULT (OUTPATIENT)
Age: 32
End: 2022-09-01

## 2022-09-01 PROCEDURE — G0108 DIAB MANAGE TRN  PER INDIV: CPT | Mod: 95

## 2022-09-07 ENCOUNTER — ASOB RESULT (OUTPATIENT)
Age: 32
End: 2022-09-07

## 2022-09-07 ENCOUNTER — APPOINTMENT (OUTPATIENT)
Dept: ANTEPARTUM | Facility: CLINIC | Age: 32
End: 2022-09-07

## 2022-09-07 ENCOUNTER — OUTPATIENT (OUTPATIENT)
Dept: OUTPATIENT SERVICES | Facility: HOSPITAL | Age: 32
LOS: 1 days | End: 2022-09-07
Payer: COMMERCIAL

## 2022-09-07 ENCOUNTER — NON-APPOINTMENT (OUTPATIENT)
Age: 32
End: 2022-09-07

## 2022-09-07 ENCOUNTER — APPOINTMENT (OUTPATIENT)
Dept: OBGYN | Facility: CLINIC | Age: 32
End: 2022-09-07

## 2022-09-07 VITALS
BODY MASS INDEX: 28.49 KG/M2 | TEMPERATURE: 97.9 F | HEIGHT: 68 IN | WEIGHT: 188 LBS | DIASTOLIC BLOOD PRESSURE: 69 MMHG | OXYGEN SATURATION: 99 % | HEART RATE: 101 BPM | SYSTOLIC BLOOD PRESSURE: 103 MMHG

## 2022-09-07 DIAGNOSIS — Z34.00 ENCOUNTER FOR SUPERVISION OF NORMAL FIRST PREGNANCY, UNSPECIFIED TRIMESTER: ICD-10-CM

## 2022-09-07 PROCEDURE — 99213 OFFICE O/P EST LOW 20 MIN: CPT | Mod: 25

## 2022-09-07 PROCEDURE — 36415 COLL VENOUS BLD VENIPUNCTURE: CPT

## 2022-09-07 PROCEDURE — G0463: CPT

## 2022-09-07 PROCEDURE — 76818 FETAL BIOPHYS PROFILE W/NST: CPT

## 2022-09-07 PROCEDURE — 90471 IMMUNIZATION ADMIN: CPT

## 2022-09-07 PROCEDURE — 81003 URINALYSIS AUTO W/O SCOPE: CPT

## 2022-09-07 PROCEDURE — 87086 URINE CULTURE/COLONY COUNT: CPT

## 2022-09-07 PROCEDURE — 90715 TDAP VACCINE 7 YRS/> IM: CPT

## 2022-09-07 PROCEDURE — 87800 DETECT AGNT MULT DNA DIREC: CPT

## 2022-09-08 DIAGNOSIS — Z23 ENCOUNTER FOR IMMUNIZATION: ICD-10-CM

## 2022-09-08 DIAGNOSIS — Z3A.23 23 WEEKS GESTATION OF PREGNANCY: ICD-10-CM

## 2022-09-08 DIAGNOSIS — Z34.93 ENCOUNTER FOR SUPERVISION OF NORMAL PREGNANCY, UNSPECIFIED, THIRD TRIMESTER: ICD-10-CM

## 2022-09-08 LAB
CANDIDA VAG CYTO: DETECTED
G VAGINALIS+PREV SP MTYP VAG QL MICRO: DETECTED
T VAGINALIS VAG QL WET PREP: NOT DETECTED

## 2022-09-09 ENCOUNTER — NON-APPOINTMENT (OUTPATIENT)
Age: 32
End: 2022-09-09

## 2022-09-09 LAB — BACTERIA UR CULT: NORMAL

## 2022-09-09 RX ORDER — CLOTRIMAZOLE 1 %
1 CREAM WITH APPLICATOR VAGINAL
Qty: 1 | Refills: 1 | Status: ACTIVE | COMMUNITY
Start: 2022-09-08 | End: 1900-01-01

## 2022-09-09 RX ORDER — METRONIDAZOLE 500 MG/1
500 TABLET ORAL TWICE DAILY
Qty: 14 | Refills: 0 | Status: ACTIVE | COMMUNITY
Start: 2022-08-12 | End: 1900-01-01

## 2022-09-14 ENCOUNTER — ASOB RESULT (OUTPATIENT)
Age: 32
End: 2022-09-14

## 2022-09-14 ENCOUNTER — APPOINTMENT (OUTPATIENT)
Dept: MATERNAL FETAL MEDICINE | Facility: CLINIC | Age: 32
End: 2022-09-14

## 2022-09-14 PROCEDURE — G0108 DIAB MANAGE TRN  PER INDIV: CPT | Mod: 95

## 2022-09-16 ENCOUNTER — APPOINTMENT (OUTPATIENT)
Dept: ANTEPARTUM | Facility: CLINIC | Age: 32
End: 2022-09-16

## 2022-09-22 ENCOUNTER — APPOINTMENT (OUTPATIENT)
Dept: ANTEPARTUM | Facility: CLINIC | Age: 32
End: 2022-09-22

## 2022-09-22 ENCOUNTER — NON-APPOINTMENT (OUTPATIENT)
Age: 32
End: 2022-09-22

## 2022-09-23 ENCOUNTER — APPOINTMENT (OUTPATIENT)
Dept: OBGYN | Facility: CLINIC | Age: 32
End: 2022-09-23

## 2022-09-23 ENCOUNTER — APPOINTMENT (OUTPATIENT)
Dept: ANTEPARTUM | Facility: CLINIC | Age: 32
End: 2022-09-23

## 2022-09-23 ENCOUNTER — OUTPATIENT (OUTPATIENT)
Dept: OUTPATIENT SERVICES | Facility: HOSPITAL | Age: 32
LOS: 1 days | End: 2022-09-23
Payer: COMMERCIAL

## 2022-09-23 VITALS
TEMPERATURE: 97.9 F | BODY MASS INDEX: 28.49 KG/M2 | HEIGHT: 68 IN | WEIGHT: 188 LBS | SYSTOLIC BLOOD PRESSURE: 110 MMHG | HEART RATE: 136 BPM | OXYGEN SATURATION: 100 % | DIASTOLIC BLOOD PRESSURE: 76 MMHG

## 2022-09-23 DIAGNOSIS — G47.9 SLEEP DISORDER, UNSPECIFIED: ICD-10-CM

## 2022-09-23 DIAGNOSIS — Z34.00 ENCOUNTER FOR SUPERVISION OF NORMAL FIRST PREGNANCY, UNSPECIFIED TRIMESTER: ICD-10-CM

## 2022-09-23 PROCEDURE — G0463: CPT

## 2022-09-23 PROCEDURE — 99213 OFFICE O/P EST LOW 20 MIN: CPT

## 2022-09-23 PROCEDURE — 81003 URINALYSIS AUTO W/O SCOPE: CPT

## 2022-09-23 RX ORDER — DOXYLAMINE SUCCINATE 25 MG
25 TABLET ORAL
Qty: 30 | Refills: 0 | Status: ACTIVE | COMMUNITY
Start: 2022-09-23 | End: 1900-01-01

## 2022-09-26 DIAGNOSIS — Z3A.34 34 WEEKS GESTATION OF PREGNANCY: ICD-10-CM

## 2022-09-26 DIAGNOSIS — G47.9 SLEEP DISORDER, UNSPECIFIED: ICD-10-CM

## 2022-09-26 DIAGNOSIS — Z34.93 ENCOUNTER FOR SUPERVISION OF NORMAL PREGNANCY, UNSPECIFIED, THIRD TRIMESTER: ICD-10-CM

## 2022-09-28 ENCOUNTER — APPOINTMENT (OUTPATIENT)
Dept: MATERNAL FETAL MEDICINE | Facility: CLINIC | Age: 32
End: 2022-09-28

## 2022-09-28 ENCOUNTER — ASOB RESULT (OUTPATIENT)
Age: 32
End: 2022-09-28

## 2022-09-28 PROCEDURE — G0108 DIAB MANAGE TRN  PER INDIV: CPT | Mod: 95

## 2022-09-30 ENCOUNTER — OUTPATIENT (OUTPATIENT)
Dept: OUTPATIENT SERVICES | Facility: HOSPITAL | Age: 32
LOS: 1 days | End: 2022-09-30
Payer: COMMERCIAL

## 2022-09-30 ENCOUNTER — ASOB RESULT (OUTPATIENT)
Age: 32
End: 2022-09-30

## 2022-09-30 ENCOUNTER — APPOINTMENT (OUTPATIENT)
Dept: ANTEPARTUM | Facility: CLINIC | Age: 32
End: 2022-09-30

## 2022-09-30 DIAGNOSIS — O26.899 OTHER SPECIFIED PREGNANCY RELATED CONDITIONS, UNSPECIFIED TRIMESTER: ICD-10-CM

## 2022-09-30 DIAGNOSIS — Z3A.00 WEEKS OF GESTATION OF PREGNANCY NOT SPECIFIED: ICD-10-CM

## 2022-09-30 PROCEDURE — 82962 GLUCOSE BLOOD TEST: CPT

## 2022-09-30 PROCEDURE — 76816 OB US FOLLOW-UP PER FETUS: CPT

## 2022-09-30 PROCEDURE — 76818 FETAL BIOPHYS PROFILE W/NST: CPT | Mod: 59

## 2022-09-30 PROCEDURE — G0463: CPT

## 2022-09-30 PROCEDURE — 59025 FETAL NON-STRESS TEST: CPT

## 2022-09-30 RX ORDER — SODIUM CHLORIDE 9 MG/ML
1000 INJECTION INTRAMUSCULAR; INTRAVENOUS; SUBCUTANEOUS ONCE
Refills: 0 | Status: DISCONTINUED | OUTPATIENT
Start: 2022-09-30 | End: 2022-09-30

## 2022-10-06 ENCOUNTER — NON-APPOINTMENT (OUTPATIENT)
Age: 32
End: 2022-10-06

## 2022-10-07 ENCOUNTER — APPOINTMENT (OUTPATIENT)
Dept: ANTEPARTUM | Facility: CLINIC | Age: 32
End: 2022-10-07

## 2022-10-07 ENCOUNTER — ASOB RESULT (OUTPATIENT)
Age: 32
End: 2022-10-07

## 2022-10-07 ENCOUNTER — APPOINTMENT (OUTPATIENT)
Dept: OBGYN | Facility: CLINIC | Age: 32
End: 2022-10-07

## 2022-10-07 ENCOUNTER — OUTPATIENT (OUTPATIENT)
Dept: OUTPATIENT SERVICES | Facility: HOSPITAL | Age: 32
LOS: 1 days | End: 2022-10-07
Payer: MEDICAID

## 2022-10-07 VITALS
OXYGEN SATURATION: 100 % | HEIGHT: 68 IN | TEMPERATURE: 97.2 F | HEART RATE: 99 BPM | WEIGHT: 195 LBS | SYSTOLIC BLOOD PRESSURE: 111 MMHG | BODY MASS INDEX: 29.55 KG/M2 | DIASTOLIC BLOOD PRESSURE: 75 MMHG

## 2022-10-07 DIAGNOSIS — Z34.00 ENCOUNTER FOR SUPERVISION OF NORMAL FIRST PREGNANCY, UNSPECIFIED TRIMESTER: ICD-10-CM

## 2022-10-07 DIAGNOSIS — Z34.93 ENCOUNTER FOR SUPERVISION OF NORMAL PREGNANCY, UNSPECIFIED, THIRD TRIMESTER: ICD-10-CM

## 2022-10-07 DIAGNOSIS — Z3A.36 36 WEEKS GESTATION OF PREGNANCY: ICD-10-CM

## 2022-10-07 DIAGNOSIS — B37.3 CANDIDIASIS OF VULVA AND VAGINA: ICD-10-CM

## 2022-10-07 PROCEDURE — 36415 COLL VENOUS BLD VENIPUNCTURE: CPT

## 2022-10-07 PROCEDURE — 36415 COLL VENOUS BLD VENIPUNCTURE: CPT | Mod: NC

## 2022-10-07 PROCEDURE — 76818 FETAL BIOPHYS PROFILE W/NST: CPT

## 2022-10-07 PROCEDURE — 87086 URINE CULTURE/COLONY COUNT: CPT

## 2022-10-07 PROCEDURE — 81003 URINALYSIS AUTO W/O SCOPE: CPT

## 2022-10-07 PROCEDURE — 85025 COMPLETE CBC W/AUTO DIFF WBC: CPT

## 2022-10-07 PROCEDURE — G0463: CPT

## 2022-10-07 PROCEDURE — 99213 OFFICE O/P EST LOW 20 MIN: CPT

## 2022-10-07 PROCEDURE — 87800 DETECT AGNT MULT DNA DIREC: CPT

## 2022-10-07 PROCEDURE — 86780 TREPONEMA PALLIDUM: CPT

## 2022-10-07 PROCEDURE — 87389 HIV-1 AG W/HIV-1&-2 AB AG IA: CPT

## 2022-10-07 PROCEDURE — 87653 STREP B DNA AMP PROBE: CPT

## 2022-10-07 RX ORDER — TERCONAZOLE 8 MG/G
0.8 CREAM VAGINAL
Qty: 1 | Refills: 1 | Status: ACTIVE | COMMUNITY
Start: 2022-10-07 | End: 1900-01-01

## 2022-10-11 LAB
BACTERIA UR CULT: NORMAL
BASOPHILS # BLD AUTO: 0.02 K/UL
BASOPHILS NFR BLD AUTO: 0.3 %
C TRACH RRNA SPEC QL NAA+PROBE: NOT DETECTED
CANDIDA VAG CYTO: DETECTED
EOSINOPHIL # BLD AUTO: 0.07 K/UL
EOSINOPHIL NFR BLD AUTO: 1 %
G VAGINALIS+PREV SP MTYP VAG QL MICRO: DETECTED
GP B STREP DNA SPEC QL NAA+PROBE: DETECTED
GP B STREP DNA SPEC QL NAA+PROBE: NORMAL
HCT VFR BLD CALC: 33.9 %
HGB BLD-MCNC: 11.3 G/DL
HIV1+2 AB SPEC QL IA.RAPID: NONREACTIVE
IMM GRANULOCYTES NFR BLD AUTO: 1 %
LYMPHOCYTES # BLD AUTO: 2.24 K/UL
LYMPHOCYTES NFR BLD AUTO: 31.5 %
MAN DIFF?: NORMAL
MCHC RBC-ENTMCNC: 30.1 PG
MCHC RBC-ENTMCNC: 33.3 GM/DL
MCV RBC AUTO: 90.4 FL
MONOCYTES # BLD AUTO: 0.53 K/UL
MONOCYTES NFR BLD AUTO: 7.5 %
N GONORRHOEA RRNA SPEC QL NAA+PROBE: NOT DETECTED
NEUTROPHILS # BLD AUTO: 4.17 K/UL
NEUTROPHILS NFR BLD AUTO: 58.7 %
PLATELET # BLD AUTO: 229 K/UL
RBC # BLD: 3.75 M/UL
RBC # FLD: 14.9 %
SOURCE AMPLIFICATION: NORMAL
SOURCE GBS: NORMAL
T PALLIDUM AB SER QL IA: NEGATIVE
T VAGINALIS VAG QL WET PREP: NOT DETECTED
WBC # FLD AUTO: 7.1 K/UL

## 2022-10-12 ENCOUNTER — APPOINTMENT (OUTPATIENT)
Dept: MATERNAL FETAL MEDICINE | Facility: CLINIC | Age: 32
End: 2022-10-12

## 2022-10-12 ENCOUNTER — ASOB RESULT (OUTPATIENT)
Age: 32
End: 2022-10-12

## 2022-10-12 PROCEDURE — G0108 DIAB MANAGE TRN  PER INDIV: CPT | Mod: 95

## 2022-10-14 ENCOUNTER — APPOINTMENT (OUTPATIENT)
Dept: OBGYN | Facility: CLINIC | Age: 32
End: 2022-10-14

## 2022-10-14 ENCOUNTER — APPOINTMENT (OUTPATIENT)
Dept: ANTEPARTUM | Facility: CLINIC | Age: 32
End: 2022-10-14

## 2022-10-14 ENCOUNTER — OUTPATIENT (OUTPATIENT)
Dept: OUTPATIENT SERVICES | Facility: HOSPITAL | Age: 32
LOS: 1 days | End: 2022-10-14
Payer: MEDICAID

## 2022-10-14 VITALS
DIASTOLIC BLOOD PRESSURE: 83 MMHG | HEART RATE: 106 BPM | SYSTOLIC BLOOD PRESSURE: 120 MMHG | OXYGEN SATURATION: 99 % | TEMPERATURE: 97.9 F | HEIGHT: 68 IN | BODY MASS INDEX: 30.31 KG/M2 | WEIGHT: 200 LBS

## 2022-10-14 DIAGNOSIS — Z34.00 ENCOUNTER FOR SUPERVISION OF NORMAL FIRST PREGNANCY, UNSPECIFIED TRIMESTER: ICD-10-CM

## 2022-10-14 PROCEDURE — 36415 COLL VENOUS BLD VENIPUNCTURE: CPT

## 2022-10-14 PROCEDURE — 36415 COLL VENOUS BLD VENIPUNCTURE: CPT | Mod: NC

## 2022-10-14 PROCEDURE — 81003 URINALYSIS AUTO W/O SCOPE: CPT

## 2022-10-14 PROCEDURE — 99213 OFFICE O/P EST LOW 20 MIN: CPT

## 2022-10-14 PROCEDURE — G0463: CPT

## 2022-10-14 RX ORDER — BLOOD-GLUCOSE METER
KIT MISCELLANEOUS
Qty: 1 | Refills: 0 | Status: ACTIVE | COMMUNITY
Start: 2022-10-14 | End: 1900-01-01

## 2022-10-15 ENCOUNTER — RESULT REVIEW (OUTPATIENT)
Age: 32
End: 2022-10-15

## 2022-10-15 ENCOUNTER — INPATIENT (INPATIENT)
Facility: HOSPITAL | Age: 32
LOS: 2 days | Discharge: HOME | End: 2022-10-18
Attending: OBSTETRICS & GYNECOLOGY | Admitting: OBSTETRICS & GYNECOLOGY
Payer: MEDICAID

## 2022-10-15 VITALS — HEIGHT: 66 IN | WEIGHT: 199.96 LBS

## 2022-10-15 DIAGNOSIS — Z3A.00 WEEKS OF GESTATION OF PREGNANCY NOT SPECIFIED: ICD-10-CM

## 2022-10-15 DIAGNOSIS — Z34.80 ENCOUNTER FOR SUPERVISION OF OTHER NORMAL PREGNANCY, UNSPECIFIED TRIMESTER: ICD-10-CM

## 2022-10-15 DIAGNOSIS — O26.899 OTHER SPECIFIED PREGNANCY RELATED CONDITIONS, UNSPECIFIED TRIMESTER: ICD-10-CM

## 2022-10-15 LAB
ALBUMIN SERPL ELPH-MCNC: 1.9 G/DL — LOW (ref 3.5–5)
ALBUMIN SERPL ELPH-MCNC: 2.1 G/DL — LOW (ref 3.5–5)
ALP SERPL-CCNC: 212 U/L — HIGH (ref 40–120)
ALP SERPL-CCNC: 230 U/L — HIGH (ref 40–120)
ALT FLD-CCNC: 14 U/L DA — SIGNIFICANT CHANGE UP (ref 10–60)
ALT FLD-CCNC: 16 U/L DA — SIGNIFICANT CHANGE UP (ref 10–60)
ANION GAP SERPL CALC-SCNC: 10 MMOL/L — SIGNIFICANT CHANGE UP (ref 5–17)
ANION GAP SERPL CALC-SCNC: 8 MMOL/L — SIGNIFICANT CHANGE UP (ref 5–17)
APPEARANCE UR: CLEAR — SIGNIFICANT CHANGE UP
APPEARANCE UR: CLEAR — SIGNIFICANT CHANGE UP
APTT BLD: 24.2 SEC — LOW (ref 27.5–35.5)
APTT BLD: 25.3 SEC — LOW (ref 27.5–35.5)
AST SERPL-CCNC: 19 U/L — SIGNIFICANT CHANGE UP (ref 10–40)
AST SERPL-CCNC: 24 U/L — SIGNIFICANT CHANGE UP (ref 10–40)
B-OH-BUTYR SERPL-SCNC: 0.1 MMOL/L — SIGNIFICANT CHANGE UP
BACTERIA # UR AUTO: ABNORMAL /HPF
BASOPHILS # BLD AUTO: 0.04 K/UL — SIGNIFICANT CHANGE UP (ref 0–0.2)
BASOPHILS NFR BLD AUTO: 0.4 % — SIGNIFICANT CHANGE UP (ref 0–2)
BILIRUB SERPL-MCNC: 0.3 MG/DL — SIGNIFICANT CHANGE UP (ref 0.2–1.2)
BILIRUB SERPL-MCNC: 0.3 MG/DL — SIGNIFICANT CHANGE UP (ref 0.2–1.2)
BILIRUB UR-MCNC: NEGATIVE — SIGNIFICANT CHANGE UP
BILIRUB UR-MCNC: NEGATIVE — SIGNIFICANT CHANGE UP
BLD GP AB SCN SERPL QL: SIGNIFICANT CHANGE UP
BUN SERPL-MCNC: 11 MG/DL — SIGNIFICANT CHANGE UP (ref 7–18)
BUN SERPL-MCNC: 8 MG/DL — SIGNIFICANT CHANGE UP (ref 7–18)
CALCIUM SERPL-MCNC: 8.3 MG/DL — LOW (ref 8.4–10.5)
CALCIUM SERPL-MCNC: 8.5 MG/DL — SIGNIFICANT CHANGE UP (ref 8.4–10.5)
CHLORIDE SERPL-SCNC: 109 MMOL/L — HIGH (ref 96–108)
CHLORIDE SERPL-SCNC: 112 MMOL/L — HIGH (ref 96–108)
CO2 SERPL-SCNC: 18 MMOL/L — LOW (ref 22–31)
CO2 SERPL-SCNC: 19 MMOL/L — LOW (ref 22–31)
COLOR SPEC: YELLOW — SIGNIFICANT CHANGE UP
COLOR SPEC: YELLOW — SIGNIFICANT CHANGE UP
COMMENT - URINE: SIGNIFICANT CHANGE UP
CREAT SERPL-MCNC: 0.8 MG/DL — SIGNIFICANT CHANGE UP (ref 0.5–1.3)
CREAT SERPL-MCNC: 0.98 MG/DL — SIGNIFICANT CHANGE UP (ref 0.5–1.3)
DIFF PNL FLD: ABNORMAL
DIFF PNL FLD: ABNORMAL
EGFR: 78 ML/MIN/1.73M2 — SIGNIFICANT CHANGE UP
EGFR: 99 ML/MIN/1.73M2 — SIGNIFICANT CHANGE UP
EOSINOPHIL # BLD AUTO: 0.04 K/UL — SIGNIFICANT CHANGE UP (ref 0–0.5)
EOSINOPHIL NFR BLD AUTO: 0.4 % — SIGNIFICANT CHANGE UP (ref 0–6)
EPI CELLS # UR: SIGNIFICANT CHANGE UP /HPF
FIBRINOGEN PPP-MCNC: 816 MG/DL — HIGH (ref 340–550)
FIBRINOGEN PPP-MCNC: 856 MG/DL — HIGH (ref 340–550)
GLUCOSE SERPL-MCNC: 148 MG/DL — HIGH (ref 70–99)
GLUCOSE SERPL-MCNC: 237 MG/DL — HIGH (ref 70–99)
GLUCOSE UR QL: 1000 MG/DL
GLUCOSE UR QL: NEGATIVE — SIGNIFICANT CHANGE UP
HCT VFR BLD CALC: 31.5 % — LOW (ref 34.5–45)
HCT VFR BLD CALC: 31.8 % — LOW (ref 34.5–45)
HCT VFR BLD CALC: 33.7 % — LOW (ref 34.5–45)
HGB BLD-MCNC: 10.4 G/DL — LOW (ref 11.5–15.5)
HGB BLD-MCNC: 10.6 G/DL — LOW (ref 11.5–15.5)
HGB BLD-MCNC: 11.4 G/DL — LOW (ref 11.5–15.5)
IMM GRANULOCYTES NFR BLD AUTO: 2.3 % — HIGH (ref 0–0.9)
INR BLD: 0.89 RATIO — SIGNIFICANT CHANGE UP (ref 0.88–1.16)
INR BLD: 0.93 RATIO — SIGNIFICANT CHANGE UP (ref 0.88–1.16)
KETONES UR-MCNC: NEGATIVE — SIGNIFICANT CHANGE UP
KETONES UR-MCNC: NEGATIVE — SIGNIFICANT CHANGE UP
LACTATE SERPL-SCNC: 1.5 MMOL/L — SIGNIFICANT CHANGE UP (ref 0.7–2)
LDH SERPL L TO P-CCNC: 161 U/L — SIGNIFICANT CHANGE UP (ref 120–225)
LEUKOCYTE ESTERASE UR-ACNC: ABNORMAL
LEUKOCYTE ESTERASE UR-ACNC: NEGATIVE — SIGNIFICANT CHANGE UP
LYMPHOCYTES # BLD AUTO: 2.28 K/UL — SIGNIFICANT CHANGE UP (ref 1–3.3)
LYMPHOCYTES # BLD AUTO: 21.6 % — SIGNIFICANT CHANGE UP (ref 13–44)
MAGNESIUM SERPL-MCNC: 4.5 MG/DL — HIGH (ref 1.6–2.6)
MAGNESIUM SERPL-MCNC: 5 MG/DL — HIGH (ref 1.6–2.6)
MCHC RBC-ENTMCNC: 29.7 PG — SIGNIFICANT CHANGE UP (ref 27–34)
MCHC RBC-ENTMCNC: 29.7 PG — SIGNIFICANT CHANGE UP (ref 27–34)
MCHC RBC-ENTMCNC: 30.2 PG — SIGNIFICANT CHANGE UP (ref 27–34)
MCHC RBC-ENTMCNC: 33 GM/DL — SIGNIFICANT CHANGE UP (ref 32–36)
MCHC RBC-ENTMCNC: 33.3 GM/DL — SIGNIFICANT CHANGE UP (ref 32–36)
MCHC RBC-ENTMCNC: 33.8 GM/DL — SIGNIFICANT CHANGE UP (ref 32–36)
MCV RBC AUTO: 89.1 FL — SIGNIFICANT CHANGE UP (ref 80–100)
MCV RBC AUTO: 89.2 FL — SIGNIFICANT CHANGE UP (ref 80–100)
MCV RBC AUTO: 90 FL — SIGNIFICANT CHANGE UP (ref 80–100)
MONOCYTES # BLD AUTO: 0.86 K/UL — SIGNIFICANT CHANGE UP (ref 0–0.9)
MONOCYTES NFR BLD AUTO: 8.2 % — SIGNIFICANT CHANGE UP (ref 2–14)
NEUTROPHILS # BLD AUTO: 7.09 K/UL — SIGNIFICANT CHANGE UP (ref 1.8–7.4)
NEUTROPHILS NFR BLD AUTO: 67.1 % — SIGNIFICANT CHANGE UP (ref 43–77)
NITRITE UR-MCNC: NEGATIVE — SIGNIFICANT CHANGE UP
NITRITE UR-MCNC: NEGATIVE — SIGNIFICANT CHANGE UP
NRBC # BLD: 0 /100 WBCS — SIGNIFICANT CHANGE UP (ref 0–0)
PH UR: 6 — SIGNIFICANT CHANGE UP (ref 5–8)
PH UR: 7 — SIGNIFICANT CHANGE UP (ref 5–8)
PLATELET # BLD AUTO: 178 K/UL — SIGNIFICANT CHANGE UP (ref 150–400)
PLATELET # BLD AUTO: 195 K/UL — SIGNIFICANT CHANGE UP (ref 150–400)
PLATELET # BLD AUTO: 201 K/UL — SIGNIFICANT CHANGE UP (ref 150–400)
POTASSIUM SERPL-MCNC: 3.7 MMOL/L — SIGNIFICANT CHANGE UP (ref 3.5–5.3)
POTASSIUM SERPL-MCNC: 4 MMOL/L — SIGNIFICANT CHANGE UP (ref 3.5–5.3)
POTASSIUM SERPL-SCNC: 3.7 MMOL/L — SIGNIFICANT CHANGE UP (ref 3.5–5.3)
POTASSIUM SERPL-SCNC: 4 MMOL/L — SIGNIFICANT CHANGE UP (ref 3.5–5.3)
PROT ?TM UR-MCNC: 28 MG/DL — HIGH (ref 0–12)
PROT SERPL-MCNC: 5.9 G/DL — LOW (ref 6–8.3)
PROT SERPL-MCNC: 6.1 G/DL — SIGNIFICANT CHANGE UP (ref 6–8.3)
PROT UR-MCNC: 100
PROT UR-MCNC: 30 MG/DL
PROTHROM AB SERPL-ACNC: 10.6 SEC — SIGNIFICANT CHANGE UP (ref 10.5–13.4)
PROTHROM AB SERPL-ACNC: 11.1 SEC — SIGNIFICANT CHANGE UP (ref 10.5–13.4)
RAPID RVP RESULT: SIGNIFICANT CHANGE UP
RBC # BLD: 3.5 M/UL — LOW (ref 3.8–5.2)
RBC # BLD: 3.57 M/UL — LOW (ref 3.8–5.2)
RBC # BLD: 3.78 M/UL — LOW (ref 3.8–5.2)
RBC # FLD: 14.2 % — SIGNIFICANT CHANGE UP (ref 10.3–14.5)
RBC # FLD: 14.4 % — SIGNIFICANT CHANGE UP (ref 10.3–14.5)
RBC # FLD: 14.4 % — SIGNIFICANT CHANGE UP (ref 10.3–14.5)
RBC CASTS # UR COMP ASSIST: NEGATIVE /HPF — SIGNIFICANT CHANGE UP (ref 0–2)
SARS-COV-2 RNA SPEC QL NAA+PROBE: SIGNIFICANT CHANGE UP
SODIUM SERPL-SCNC: 136 MMOL/L — SIGNIFICANT CHANGE UP (ref 135–145)
SODIUM SERPL-SCNC: 140 MMOL/L — SIGNIFICANT CHANGE UP (ref 135–145)
SP GR SPEC: 1 — LOW (ref 1.01–1.02)
SP GR SPEC: 1.01 — SIGNIFICANT CHANGE UP (ref 1.01–1.02)
T PALLIDUM AB TITR SER: NEGATIVE — SIGNIFICANT CHANGE UP
URATE SERPL-MCNC: 5.6 MG/DL — SIGNIFICANT CHANGE UP (ref 2.5–7)
UROBILINOGEN FLD QL: NEGATIVE — SIGNIFICANT CHANGE UP
UROBILINOGEN FLD QL: NEGATIVE — SIGNIFICANT CHANGE UP
WBC # BLD: 10.51 K/UL — HIGH (ref 3.8–10.5)
WBC # BLD: 8.59 K/UL — SIGNIFICANT CHANGE UP (ref 3.8–10.5)
WBC # BLD: 9.82 K/UL — SIGNIFICANT CHANGE UP (ref 3.8–10.5)
WBC # FLD AUTO: 10.51 K/UL — HIGH (ref 3.8–10.5)
WBC # FLD AUTO: 8.59 K/UL — SIGNIFICANT CHANGE UP (ref 3.8–10.5)
WBC # FLD AUTO: 9.82 K/UL — SIGNIFICANT CHANGE UP (ref 3.8–10.5)
WBC UR QL: SIGNIFICANT CHANGE UP /HPF (ref 0–5)

## 2022-10-15 PROCEDURE — 88307 TISSUE EXAM BY PATHOLOGIST: CPT | Mod: 26

## 2022-10-15 PROCEDURE — 59409 OBSTETRICAL CARE: CPT | Mod: U7

## 2022-10-15 RX ORDER — OXYCODONE HYDROCHLORIDE 5 MG/1
5 TABLET ORAL ONCE
Refills: 0 | Status: DISCONTINUED | OUTPATIENT
Start: 2022-10-15 | End: 2022-10-18

## 2022-10-15 RX ORDER — DEXTROSE 50 % IN WATER 50 %
25 SYRINGE (ML) INTRAVENOUS ONCE
Refills: 0 | Status: DISCONTINUED | OUTPATIENT
Start: 2022-10-15 | End: 2022-10-18

## 2022-10-15 RX ORDER — SODIUM CHLORIDE 9 MG/ML
1000 INJECTION, SOLUTION INTRAVENOUS
Refills: 0 | Status: DISCONTINUED | OUTPATIENT
Start: 2022-10-15 | End: 2022-10-15

## 2022-10-15 RX ORDER — DIBUCAINE 1 %
1 OINTMENT (GRAM) RECTAL EVERY 6 HOURS
Refills: 0 | Status: DISCONTINUED | OUTPATIENT
Start: 2022-10-15 | End: 2022-10-18

## 2022-10-15 RX ORDER — AER TRAVELER 0.5 G/1
1 SOLUTION RECTAL; TOPICAL EVERY 4 HOURS
Refills: 0 | Status: DISCONTINUED | OUTPATIENT
Start: 2022-10-15 | End: 2022-10-18

## 2022-10-15 RX ORDER — OXYTOCIN 10 UNIT/ML
VIAL (ML) INJECTION
Qty: 30 | Refills: 0 | Status: DISCONTINUED | OUTPATIENT
Start: 2022-10-15 | End: 2022-10-15

## 2022-10-15 RX ORDER — DEXTROSE 50 % IN WATER 50 %
15 SYRINGE (ML) INTRAVENOUS ONCE
Refills: 0 | Status: DISCONTINUED | OUTPATIENT
Start: 2022-10-15 | End: 2022-10-18

## 2022-10-15 RX ORDER — SODIUM CHLORIDE 9 MG/ML
1000 INJECTION INTRAMUSCULAR; INTRAVENOUS; SUBCUTANEOUS
Refills: 0 | Status: DISCONTINUED | OUTPATIENT
Start: 2022-10-15 | End: 2022-10-15

## 2022-10-15 RX ORDER — OXYCODONE HYDROCHLORIDE 5 MG/1
5 TABLET ORAL
Refills: 0 | Status: DISCONTINUED | OUTPATIENT
Start: 2022-10-15 | End: 2022-10-18

## 2022-10-15 RX ORDER — MAGNESIUM SULFATE 500 MG/ML
4 VIAL (ML) INJECTION ONCE
Refills: 0 | Status: COMPLETED | OUTPATIENT
Start: 2022-10-15 | End: 2022-10-15

## 2022-10-15 RX ORDER — DIPHENHYDRAMINE HCL 50 MG
25 CAPSULE ORAL EVERY 6 HOURS
Refills: 0 | Status: DISCONTINUED | OUTPATIENT
Start: 2022-10-15 | End: 2022-10-18

## 2022-10-15 RX ORDER — SODIUM CHLORIDE 9 MG/ML
1000 INJECTION, SOLUTION INTRAVENOUS ONCE
Refills: 0 | Status: COMPLETED | OUTPATIENT
Start: 2022-10-15 | End: 2022-10-15

## 2022-10-15 RX ORDER — TETANUS TOXOID, REDUCED DIPHTHERIA TOXOID AND ACELLULAR PERTUSSIS VACCINE, ADSORBED 5; 2.5; 8; 8; 2.5 [IU]/.5ML; [IU]/.5ML; UG/.5ML; UG/.5ML; UG/.5ML
0.5 SUSPENSION INTRAMUSCULAR ONCE
Refills: 0 | Status: DISCONTINUED | OUTPATIENT
Start: 2022-10-15 | End: 2022-10-18

## 2022-10-15 RX ORDER — INSULIN LISPRO 100/ML
VIAL (ML) SUBCUTANEOUS AT BEDTIME
Refills: 0 | Status: DISCONTINUED | OUTPATIENT
Start: 2022-10-15 | End: 2022-10-18

## 2022-10-15 RX ORDER — IBUPROFEN 200 MG
600 TABLET ORAL EVERY 6 HOURS
Refills: 0 | Status: DISCONTINUED | OUTPATIENT
Start: 2022-10-15 | End: 2022-10-18

## 2022-10-15 RX ORDER — LANOLIN
1 OINTMENT (GRAM) TOPICAL EVERY 6 HOURS
Refills: 0 | Status: DISCONTINUED | OUTPATIENT
Start: 2022-10-15 | End: 2022-10-18

## 2022-10-15 RX ORDER — ACETAMINOPHEN 500 MG
975 TABLET ORAL
Refills: 0 | Status: DISCONTINUED | OUTPATIENT
Start: 2022-10-15 | End: 2022-10-18

## 2022-10-15 RX ORDER — INSULIN LISPRO 100/ML
VIAL (ML) SUBCUTANEOUS
Refills: 0 | Status: DISCONTINUED | OUTPATIENT
Start: 2022-10-15 | End: 2022-10-18

## 2022-10-15 RX ORDER — AMPICILLIN TRIHYDRATE 250 MG
1 CAPSULE ORAL EVERY 4 HOURS
Refills: 0 | Status: DISCONTINUED | OUTPATIENT
Start: 2022-10-15 | End: 2022-10-15

## 2022-10-15 RX ORDER — GLUCAGON INJECTION, SOLUTION 0.5 MG/.1ML
1 INJECTION, SOLUTION SUBCUTANEOUS ONCE
Refills: 0 | Status: DISCONTINUED | OUTPATIENT
Start: 2022-10-15 | End: 2022-10-18

## 2022-10-15 RX ORDER — SODIUM CHLORIDE 9 MG/ML
1000 INJECTION, SOLUTION INTRAVENOUS
Refills: 0 | Status: DISCONTINUED | OUTPATIENT
Start: 2022-10-15 | End: 2022-10-18

## 2022-10-15 RX ORDER — PRAMOXINE HYDROCHLORIDE 150 MG/15G
1 AEROSOL, FOAM RECTAL EVERY 4 HOURS
Refills: 0 | Status: DISCONTINUED | OUTPATIENT
Start: 2022-10-15 | End: 2022-10-18

## 2022-10-15 RX ORDER — HYDROCORTISONE 1 %
1 OINTMENT (GRAM) TOPICAL EVERY 6 HOURS
Refills: 0 | Status: DISCONTINUED | OUTPATIENT
Start: 2022-10-15 | End: 2022-10-18

## 2022-10-15 RX ORDER — MAGNESIUM SULFATE 500 MG/ML
2 VIAL (ML) INJECTION
Qty: 40 | Refills: 0 | Status: DISCONTINUED | OUTPATIENT
Start: 2022-10-15 | End: 2022-10-18

## 2022-10-15 RX ORDER — LABETALOL HCL 100 MG
20 TABLET ORAL ONCE
Refills: 0 | Status: COMPLETED | OUTPATIENT
Start: 2022-10-15 | End: 2022-10-15

## 2022-10-15 RX ORDER — NIFEDIPINE 30 MG
30 TABLET, EXTENDED RELEASE 24 HR ORAL DAILY
Refills: 0 | Status: DISCONTINUED | OUTPATIENT
Start: 2022-10-15 | End: 2022-10-16

## 2022-10-15 RX ORDER — CITRIC ACID/SODIUM CITRATE 300-500 MG
15 SOLUTION, ORAL ORAL EVERY 6 HOURS
Refills: 0 | Status: DISCONTINUED | OUTPATIENT
Start: 2022-10-15 | End: 2022-10-15

## 2022-10-15 RX ORDER — OXYTOCIN 10 UNIT/ML
333.33 VIAL (ML) INJECTION
Qty: 20 | Refills: 0 | Status: DISCONTINUED | OUTPATIENT
Start: 2022-10-15 | End: 2022-10-18

## 2022-10-15 RX ORDER — MAGNESIUM HYDROXIDE 400 MG/1
30 TABLET, CHEWABLE ORAL
Refills: 0 | Status: DISCONTINUED | OUTPATIENT
Start: 2022-10-15 | End: 2022-10-18

## 2022-10-15 RX ORDER — SODIUM CHLORIDE 9 MG/ML
3 INJECTION INTRAMUSCULAR; INTRAVENOUS; SUBCUTANEOUS EVERY 8 HOURS
Refills: 0 | Status: DISCONTINUED | OUTPATIENT
Start: 2022-10-15 | End: 2022-10-18

## 2022-10-15 RX ORDER — ACETAMINOPHEN 500 MG
975 TABLET ORAL ONCE
Refills: 0 | Status: COMPLETED | OUTPATIENT
Start: 2022-10-15 | End: 2022-10-15

## 2022-10-15 RX ORDER — AMPICILLIN TRIHYDRATE 250 MG
2 CAPSULE ORAL ONCE
Refills: 0 | Status: COMPLETED | OUTPATIENT
Start: 2022-10-15 | End: 2022-10-15

## 2022-10-15 RX ORDER — BENZOCAINE 10 %
1 GEL (GRAM) MUCOUS MEMBRANE EVERY 6 HOURS
Refills: 0 | Status: DISCONTINUED | OUTPATIENT
Start: 2022-10-15 | End: 2022-10-18

## 2022-10-15 RX ORDER — DEXTROSE 50 % IN WATER 50 %
12.5 SYRINGE (ML) INTRAVENOUS ONCE
Refills: 0 | Status: DISCONTINUED | OUTPATIENT
Start: 2022-10-15 | End: 2022-10-18

## 2022-10-15 RX ORDER — FOLIC ACID 0.8 MG
1 TABLET ORAL DAILY
Refills: 0 | Status: DISCONTINUED | OUTPATIENT
Start: 2022-10-15 | End: 2022-10-16

## 2022-10-15 RX ORDER — KETOROLAC TROMETHAMINE 30 MG/ML
30 SYRINGE (ML) INJECTION ONCE
Refills: 0 | Status: DISCONTINUED | OUTPATIENT
Start: 2022-10-15 | End: 2022-10-15

## 2022-10-15 RX ORDER — SIMETHICONE 80 MG/1
80 TABLET, CHEWABLE ORAL EVERY 4 HOURS
Refills: 0 | Status: DISCONTINUED | OUTPATIENT
Start: 2022-10-15 | End: 2022-10-18

## 2022-10-15 RX ADMIN — Medication 975 MILLIGRAM(S): at 23:01

## 2022-10-15 RX ADMIN — Medication 108 GRAM(S): at 10:25

## 2022-10-15 RX ADMIN — Medication 975 MILLIGRAM(S): at 13:30

## 2022-10-15 RX ADMIN — Medication 2: at 13:00

## 2022-10-15 RX ADMIN — Medication 1 MILLIGRAM(S): at 13:00

## 2022-10-15 RX ADMIN — Medication 1 TABLET(S): at 13:00

## 2022-10-15 RX ADMIN — Medication 20 MILLIGRAM(S): at 06:23

## 2022-10-15 RX ADMIN — SODIUM CHLORIDE 3 MILLILITER(S): 9 INJECTION INTRAMUSCULAR; INTRAVENOUS; SUBCUTANEOUS at 13:59

## 2022-10-15 RX ADMIN — SODIUM CHLORIDE 1000 MILLILITER(S): 9 INJECTION, SOLUTION INTRAVENOUS at 03:00

## 2022-10-15 RX ADMIN — Medication 6 MILLIUNIT(S)/MIN: at 07:45

## 2022-10-15 RX ADMIN — Medication 30 MILLIGRAM(S): at 17:46

## 2022-10-15 RX ADMIN — Medication 50 GM/HR: at 06:47

## 2022-10-15 RX ADMIN — Medication 30 MILLIGRAM(S): at 17:15

## 2022-10-15 RX ADMIN — Medication 30 MILLIGRAM(S): at 13:13

## 2022-10-15 RX ADMIN — Medication 300 GRAM(S): at 06:27

## 2022-10-15 RX ADMIN — SODIUM CHLORIDE 75 MILLILITER(S): 9 INJECTION, SOLUTION INTRAVENOUS at 20:30

## 2022-10-15 RX ADMIN — Medication 216 GRAM(S): at 06:38

## 2022-10-15 RX ADMIN — Medication 975 MILLIGRAM(S): at 07:14

## 2022-10-15 RX ADMIN — Medication 975 MILLIGRAM(S): at 13:00

## 2022-10-15 RX ADMIN — Medication 4: at 17:19

## 2022-10-15 RX ADMIN — Medication 975 MILLIGRAM(S): at 06:44

## 2022-10-15 NOTE — PROGRESS NOTE ADULT - SUBJECTIVE AND OBJECTIVE BOX
Patient seen resting comfortably.  No new complaints.  Denies HA, CP, SOB, N/V/D, dizziness, palpitations, worsening abdominal pain, worsening vaginal bleeding, or any other concerns.  States headache resolved with tylenol.      Vital Signs Last 24 Hrs  T(C): 36.8 (15 Oct 2022 11:49), Max: 36.8 (15 Oct 2022 11:49)  T(F): 98.2 (15 Oct 2022 11:49), Max: 98.2 (15 Oct 2022 11:49)  HR: 69 (15 Oct 2022 13:16) (61 - 80)  BP: 155/83 (15 Oct 2022 13:16) (151/80 - 166/98)  RR: 16 (15 Oct 2022 13:16) (16 - 18)  SpO2: 100% (15 Oct 2022 13:16) (99% - 100%)    Parameters below as of 15 Oct 2022 13:16  Patient On (Oxygen Delivery Method): room air    Calhoun draining clear adequate urine    Gen: A&O x 3, NAD  Chest: CTABL  Cardiac: S1+S2+ RRR  Breast: Soft, nontender, nonengorged  Abdomen: Nontender, nondistended, +BS, Fundus firm  Gyn: Minimal bleeding, calhoun in place  Extremities: Nontender, DTRS 2+, no worsening edema, venodynes intact                          10.6   10.51 )-----------( 201      ( 15 Oct 2022 07:08 )             31.8     Magnesium: 4.5    A/P:  Patient  is a 32 year old P3 s/p  on magnesium for pre-eclampsia with severe features    -Pain management as needed  -Advance as per protocol  -Continue magnesium x 24 hours  -Strict I/O, labs q 6, neuro checks  -Procardia 30 Xl added for bp control  -Continue current care    Dr Shore aware

## 2022-10-15 NOTE — PATIENT PROFILE OB - VISION (WITH CORRECTIVE LENSES IF THE PATIENT USUALLY WEARS THEM):
Mr ClementBethany Bathapril called concerning his CT scans  His scan showed a left ureter dilatation  He saw a urologist and he recommended cystoscopy  Normal vision: sees adequately in most situations; can see medication labels, newsprint

## 2022-10-15 NOTE — PROGRESS NOTE ADULT - SUBJECTIVE AND OBJECTIVE BOX
PA NOTE:  ppd#0 s/p  37wks  uncontrolled gdma2  on sliding scale ac/hs post delivery-endo consult in am  induced for pec w/ severe fx: BP on mgso4 2g/hr   mg level at 6pm 5.0  next full hellp labs at 12am w mg level  case management  on procardia 30xl started this afternoon  pt seen at bedside- at this time offers no acute complaints     Patient seen at bedside resting comfortably offers no new complaints  Denies HA, CP, SOB, N/V/D, dizziness, palpitations, worsening abdominal pain, worsening vaginal bleeding, or any other concerns. voiding into calhoun clr    Vital Signs Last 24 Hrs  T(C): 37.2 (15 Oct 2022 17:15), Max: 37.2 (15 Oct 2022 17:15)  T(F): 99 (15 Oct 2022 17:15), Max: 99 (15 Oct 2022 17:15)  HR: 67 (15 Oct 2022 18:50) (61 - 81)  BP: 150/85 (15 Oct 2022 18:50) (133/73 - 177/87)  BP(mean): --  RR: 16 (15 Oct 2022 18:50) (16 - 18)  SpO2: 99% (15 Oct 2022 18:50) (98% - 100%)    Parameters below as of 15 Oct 2022 18:50  Patient On (Oxygen Delivery Method): room air       urinary output approx _300-700ml____ hourly    Gen: A&O x 3, NAD  Chest: CTA B/L  Cardiac: S1,S2 RRR  Breast: Soft, nontender, nonengorged  Abdomen: +BS; soft; Nontender, nondistended  fundus firm  Gyn: Minimal lochia  Extremities: Nontender, DTRS 2+ b/l; edema 2+b/l; negative clonus; venodynes in place                            11.4   9.82  )-----------( 178      ( 15 Oct 2022 17:40 )             33.7       10-15    140  |  112<H>  |  8   ----------------------------<  148<H>  3.7   |  18<L>  |  0.80    Ca    8.3<L>      15 Oct 2022 07:08  Mg     5.0     10-15    TPro  5.9<L>  /  Alb  1.9<L>  /  TBili  0.3  /  DBili  x   /  AST  19  /  ALT  14  /  AlkPhos  212<H>  10-15    LIVER FUNCTIONS - ( 15 Oct 2022 07:08 )  Alb: 1.9 g/dL / Pro: 5.9 g/dL / ALK PHOS: 212 U/L / ALT: 14 U/L DA / AST: 19 U/L / GGT: x              PT/INR - ( 15 Oct 2022 07:08 )   PT: 10.6 sec;   INR: 0.89 ratio         PTT - ( 15 Oct 2022 07:08 )  PTT:24.2 sec     PA NOTE:  ppd#0 s/p  37wks  uncontrolled gdma2  on sliding scale ac/hs post delivery-endo consult in am  induced for pec w/ severe fx: BP on mgso4 2g/hr   mg level at 6pm 5.0  next full hellp labs at 12am w mg level  case management  on procardia 30xl started this afternoon  pt seen at bedside- at this time offers no acute complaints     Patient seen at bedside resting comfortably offers no new complaints  Denies HA, CP, SOB, N/V/D, dizziness, palpitations, worsening abdominal pain, worsening vaginal bleeding, or any other concerns. voiding into calhoun clr    Vital Signs Last 24 Hrs  T(C): 37.2 (15 Oct 2022 17:15), Max: 37.2 (15 Oct 2022 17:15)  T(F): 99 (15 Oct 2022 17:15), Max: 99 (15 Oct 2022 17:15)  HR: 67 (15 Oct 2022 18:50) (61 - 81)  BP: 150/85 (15 Oct 2022 18:50) (133/73 - 177/87)  BP(mean): --  RR: 16 (15 Oct 2022 18:50) (16 - 18)  SpO2: 99% (15 Oct 2022 18:50) (98% - 100%)    Parameters below as of 15 Oct 2022 18:50  Patient On (Oxygen Delivery Method): room air       urinary output approx _300-700ml____ hourly    Gen: A&O x 3, NAD  Chest: CTA B/L  Cardiac: S1,S2 RRR  Breast: Soft, nontender, nonengorged  Abdomen: +BS; soft; Nontender, nondistended  fundus firm  Gyn: Minimal lochia  Extremities: Nontender, DTRS 2+ b/l; edema 2+b/l; negative clonus; venodynes in place                            11.4   9.82  )-----------( 178      ( 15 Oct 2022 17:40 )             33.7       10-15    140  |  112<H>  |  8   ----------------------------<  148<H>  3.7   |  18<L>  |  0.80    Ca    8.3<L>      15 Oct 2022 07:08  Mg     5.0     10-15    TPro  5.9<L>  /  Alb  1.9<L>  /  TBili  0.3  /  DBili  x   /  AST  19  /  ALT  14  /  AlkPhos  212<H>  10-15    LIVER FUNCTIONS - ( 15 Oct 2022 07:08 )  Alb: 1.9 g/dL / Pro: 5.9 g/dL / ALK PHOS: 212 U/L / ALT: 14 U/L DA / AST: 19 U/L / GGT: x              PT/INR - ( 15 Oct 2022 07:08 )   PT: 10.6 sec;   INR: 0.89 ratio         PTT - ( 15 Oct 2022 07:08 )  PTT:24.2 sec    cultures urine/blood testing

## 2022-10-15 NOTE — PATIENT PROFILE OB - FALL HARM RISK - UNIVERSAL INTERVENTIONS
Bed in lowest position, wheels locked, appropriate side rails in place/Call bell, personal items and telephone in reach/Instruct patient to call for assistance before getting out of bed or chair/Non-slip footwear when patient is out of bed/[place] to call system/Physically safe environment - no spills, clutter or unnecessary equipment/Purposeful Proactive Rounding/Room/bathroom lighting operational, light cord in reach

## 2022-10-16 ENCOUNTER — TRANSCRIPTION ENCOUNTER (OUTPATIENT)
Age: 32
End: 2022-10-16

## 2022-10-16 LAB
A1C WITH ESTIMATED AVERAGE GLUCOSE RESULT: 7.2 % — HIGH (ref 4–5.6)
ALBUMIN SERPL ELPH-MCNC: 1.7 G/DL — LOW (ref 3.5–5)
ALBUMIN SERPL ELPH-MCNC: 1.7 G/DL — LOW (ref 3.5–5)
ALP SERPL-CCNC: 176 U/L — HIGH (ref 40–120)
ALP SERPL-CCNC: 179 U/L — HIGH (ref 40–120)
ALT FLD-CCNC: 10 U/L DA — SIGNIFICANT CHANGE UP (ref 10–60)
ALT FLD-CCNC: 11 U/L DA — SIGNIFICANT CHANGE UP (ref 10–60)
ANION GAP SERPL CALC-SCNC: 11 MMOL/L — SIGNIFICANT CHANGE UP (ref 5–17)
ANION GAP SERPL CALC-SCNC: 9 MMOL/L — SIGNIFICANT CHANGE UP (ref 5–17)
APTT BLD: 24.4 SEC — LOW (ref 27.5–35.5)
AST SERPL-CCNC: 14 U/L — SIGNIFICANT CHANGE UP (ref 10–40)
AST SERPL-CCNC: 15 U/L — SIGNIFICANT CHANGE UP (ref 10–40)
BASOPHILS # BLD AUTO: 0.04 K/UL — SIGNIFICANT CHANGE UP (ref 0–0.2)
BASOPHILS # BLD AUTO: 0.05 K/UL — SIGNIFICANT CHANGE UP (ref 0–0.2)
BASOPHILS NFR BLD AUTO: 0.4 % — SIGNIFICANT CHANGE UP (ref 0–2)
BASOPHILS NFR BLD AUTO: 0.5 % — SIGNIFICANT CHANGE UP (ref 0–2)
BILIRUB SERPL-MCNC: 0.2 MG/DL — SIGNIFICANT CHANGE UP (ref 0.2–1.2)
BILIRUB SERPL-MCNC: 0.3 MG/DL — SIGNIFICANT CHANGE UP (ref 0.2–1.2)
BUN SERPL-MCNC: 5 MG/DL — LOW (ref 7–18)
BUN SERPL-MCNC: 6 MG/DL — LOW (ref 7–18)
CALCIUM SERPL-MCNC: 7.3 MG/DL — LOW (ref 8.4–10.5)
CALCIUM SERPL-MCNC: 7.3 MG/DL — LOW (ref 8.4–10.5)
CHLORIDE SERPL-SCNC: 107 MMOL/L — SIGNIFICANT CHANGE UP (ref 96–108)
CHLORIDE SERPL-SCNC: 109 MMOL/L — HIGH (ref 96–108)
CO2 SERPL-SCNC: 21 MMOL/L — LOW (ref 22–31)
CO2 SERPL-SCNC: 22 MMOL/L — SIGNIFICANT CHANGE UP (ref 22–31)
CREAT SERPL-MCNC: 0.77 MG/DL — SIGNIFICANT CHANGE UP (ref 0.5–1.3)
CREAT SERPL-MCNC: 0.82 MG/DL — SIGNIFICANT CHANGE UP (ref 0.5–1.3)
CULTURE RESULTS: SIGNIFICANT CHANGE UP
EGFR: 104 ML/MIN/1.73M2 — SIGNIFICANT CHANGE UP
EGFR: 96 ML/MIN/1.73M2 — SIGNIFICANT CHANGE UP
EOSINOPHIL # BLD AUTO: 0.06 K/UL — SIGNIFICANT CHANGE UP (ref 0–0.5)
EOSINOPHIL # BLD AUTO: 0.07 K/UL — SIGNIFICANT CHANGE UP (ref 0–0.5)
EOSINOPHIL NFR BLD AUTO: 0.6 % — SIGNIFICANT CHANGE UP (ref 0–6)
EOSINOPHIL NFR BLD AUTO: 0.8 % — SIGNIFICANT CHANGE UP (ref 0–6)
ESTIMATED AVERAGE GLUCOSE: 160 MG/DL — HIGH (ref 68–114)
FIBRINOGEN PPP-MCNC: 856 MG/DL — HIGH (ref 340–550)
GLUCOSE SERPL-MCNC: 147 MG/DL — HIGH (ref 70–99)
GLUCOSE SERPL-MCNC: 173 MG/DL — HIGH (ref 70–99)
HCT VFR BLD CALC: 31.5 % — LOW (ref 34.5–45)
HCT VFR BLD CALC: 32.2 % — LOW (ref 34.5–45)
HGB BLD-MCNC: 10.7 G/DL — LOW (ref 11.5–15.5)
HGB BLD-MCNC: 10.8 G/DL — LOW (ref 11.5–15.5)
IMM GRANULOCYTES NFR BLD AUTO: 0.7 % — SIGNIFICANT CHANGE UP (ref 0–0.9)
IMM GRANULOCYTES NFR BLD AUTO: 0.9 % — SIGNIFICANT CHANGE UP (ref 0–0.9)
INR BLD: 0.85 RATIO — LOW (ref 0.88–1.16)
LDH SERPL L TO P-CCNC: 176 U/L — SIGNIFICANT CHANGE UP (ref 120–225)
LYMPHOCYTES # BLD AUTO: 2.13 K/UL — SIGNIFICANT CHANGE UP (ref 1–3.3)
LYMPHOCYTES # BLD AUTO: 2.31 K/UL — SIGNIFICANT CHANGE UP (ref 1–3.3)
LYMPHOCYTES # BLD AUTO: 21.7 % — SIGNIFICANT CHANGE UP (ref 13–44)
LYMPHOCYTES # BLD AUTO: 25.4 % — SIGNIFICANT CHANGE UP (ref 13–44)
MAGNESIUM SERPL-MCNC: 5 MG/DL — HIGH (ref 1.6–2.6)
MAGNESIUM SERPL-MCNC: 5.8 MG/DL — HIGH (ref 1.6–2.6)
MCHC RBC-ENTMCNC: 29.8 PG — SIGNIFICANT CHANGE UP (ref 27–34)
MCHC RBC-ENTMCNC: 30.1 PG — SIGNIFICANT CHANGE UP (ref 27–34)
MCHC RBC-ENTMCNC: 33.5 GM/DL — SIGNIFICANT CHANGE UP (ref 32–36)
MCHC RBC-ENTMCNC: 34 GM/DL — SIGNIFICANT CHANGE UP (ref 32–36)
MCV RBC AUTO: 88.5 FL — SIGNIFICANT CHANGE UP (ref 80–100)
MCV RBC AUTO: 89 FL — SIGNIFICANT CHANGE UP (ref 80–100)
MONOCYTES # BLD AUTO: 0.42 K/UL — SIGNIFICANT CHANGE UP (ref 0–0.9)
MONOCYTES # BLD AUTO: 0.58 K/UL — SIGNIFICANT CHANGE UP (ref 0–0.9)
MONOCYTES NFR BLD AUTO: 4.6 % — SIGNIFICANT CHANGE UP (ref 2–14)
MONOCYTES NFR BLD AUTO: 5.9 % — SIGNIFICANT CHANGE UP (ref 2–14)
NEUTROPHILS # BLD AUTO: 6.16 K/UL — SIGNIFICANT CHANGE UP (ref 1.8–7.4)
NEUTROPHILS # BLD AUTO: 6.91 K/UL — SIGNIFICANT CHANGE UP (ref 1.8–7.4)
NEUTROPHILS NFR BLD AUTO: 67.9 % — SIGNIFICANT CHANGE UP (ref 43–77)
NEUTROPHILS NFR BLD AUTO: 70.6 % — SIGNIFICANT CHANGE UP (ref 43–77)
NRBC # BLD: 0 /100 WBCS — SIGNIFICANT CHANGE UP (ref 0–0)
NRBC # BLD: 0 /100 WBCS — SIGNIFICANT CHANGE UP (ref 0–0)
PLATELET # BLD AUTO: 175 K/UL — SIGNIFICANT CHANGE UP (ref 150–400)
PLATELET # BLD AUTO: 194 K/UL — SIGNIFICANT CHANGE UP (ref 150–400)
POTASSIUM SERPL-MCNC: 3.5 MMOL/L — SIGNIFICANT CHANGE UP (ref 3.5–5.3)
POTASSIUM SERPL-MCNC: 3.7 MMOL/L — SIGNIFICANT CHANGE UP (ref 3.5–5.3)
POTASSIUM SERPL-SCNC: 3.5 MMOL/L — SIGNIFICANT CHANGE UP (ref 3.5–5.3)
POTASSIUM SERPL-SCNC: 3.7 MMOL/L — SIGNIFICANT CHANGE UP (ref 3.5–5.3)
PROT SERPL-MCNC: 5.1 G/DL — LOW (ref 6–8.3)
PROT SERPL-MCNC: 5.4 G/DL — LOW (ref 6–8.3)
PROTHROM AB SERPL-ACNC: 10.1 SEC — LOW (ref 10.5–13.4)
RBC # BLD: 3.56 M/UL — LOW (ref 3.8–5.2)
RBC # BLD: 3.62 M/UL — LOW (ref 3.8–5.2)
RBC # FLD: 14.2 % — SIGNIFICANT CHANGE UP (ref 10.3–14.5)
RBC # FLD: 14.5 % — SIGNIFICANT CHANGE UP (ref 10.3–14.5)
SODIUM SERPL-SCNC: 139 MMOL/L — SIGNIFICANT CHANGE UP (ref 135–145)
SODIUM SERPL-SCNC: 140 MMOL/L — SIGNIFICANT CHANGE UP (ref 135–145)
SPECIMEN SOURCE: SIGNIFICANT CHANGE UP
URATE SERPL-MCNC: 5.4 MG/DL — SIGNIFICANT CHANGE UP (ref 2.5–7)
WBC # BLD: 9.08 K/UL — SIGNIFICANT CHANGE UP (ref 3.8–10.5)
WBC # BLD: 9.8 K/UL — SIGNIFICANT CHANGE UP (ref 3.8–10.5)
WBC # FLD AUTO: 9.08 K/UL — SIGNIFICANT CHANGE UP (ref 3.8–10.5)
WBC # FLD AUTO: 9.8 K/UL — SIGNIFICANT CHANGE UP (ref 3.8–10.5)

## 2022-10-16 RX ORDER — NIFEDIPINE 30 MG
60 TABLET, EXTENDED RELEASE 24 HR ORAL EVERY 24 HOURS
Refills: 0 | Status: DISCONTINUED | OUTPATIENT
Start: 2022-10-16 | End: 2022-10-18

## 2022-10-16 RX ORDER — IBUPROFEN 200 MG
600 TABLET ORAL EVERY 6 HOURS
Refills: 0 | Status: DISCONTINUED | OUTPATIENT
Start: 2022-10-16 | End: 2022-10-18

## 2022-10-16 RX ORDER — LABETALOL HCL 100 MG
20 TABLET ORAL ONCE
Refills: 0 | Status: COMPLETED | OUTPATIENT
Start: 2022-10-16 | End: 2022-10-16

## 2022-10-16 RX ORDER — LABETALOL HCL 100 MG
200 TABLET ORAL ONCE
Refills: 0 | Status: COMPLETED | OUTPATIENT
Start: 2022-10-16 | End: 2022-10-16

## 2022-10-16 RX ORDER — LABETALOL HCL 100 MG
200 TABLET ORAL EVERY 8 HOURS
Refills: 0 | Status: DISCONTINUED | OUTPATIENT
Start: 2022-10-16 | End: 2022-10-18

## 2022-10-16 RX ADMIN — Medication 20 MILLIGRAM(S): at 17:25

## 2022-10-16 RX ADMIN — SODIUM CHLORIDE 3 MILLILITER(S): 9 INJECTION INTRAMUSCULAR; INTRAVENOUS; SUBCUTANEOUS at 07:43

## 2022-10-16 RX ADMIN — Medication 600 MILLIGRAM(S): at 11:56

## 2022-10-16 RX ADMIN — Medication 1 SPRAY(S): at 19:28

## 2022-10-16 RX ADMIN — Medication 4: at 11:57

## 2022-10-16 RX ADMIN — Medication 975 MILLIGRAM(S): at 19:53

## 2022-10-16 RX ADMIN — Medication 200 MILLIGRAM(S): at 21:35

## 2022-10-16 RX ADMIN — Medication 975 MILLIGRAM(S): at 19:27

## 2022-10-16 RX ADMIN — Medication 1 MILLIGRAM(S): at 11:56

## 2022-10-16 RX ADMIN — Medication 975 MILLIGRAM(S): at 05:02

## 2022-10-16 RX ADMIN — Medication 975 MILLIGRAM(S): at 00:00

## 2022-10-16 RX ADMIN — Medication 1 TABLET(S): at 11:56

## 2022-10-16 RX ADMIN — Medication 600 MILLIGRAM(S): at 13:00

## 2022-10-16 RX ADMIN — Medication 60 MILLIGRAM(S): at 13:02

## 2022-10-16 RX ADMIN — Medication 2: at 16:33

## 2022-10-16 RX ADMIN — SODIUM CHLORIDE 3 MILLILITER(S): 9 INJECTION INTRAMUSCULAR; INTRAVENOUS; SUBCUTANEOUS at 21:42

## 2022-10-16 RX ADMIN — Medication 20 MILLIGRAM(S): at 14:45

## 2022-10-16 RX ADMIN — SODIUM CHLORIDE 3 MILLILITER(S): 9 INJECTION INTRAMUSCULAR; INTRAVENOUS; SUBCUTANEOUS at 15:11

## 2022-10-16 RX ADMIN — Medication 2: at 07:58

## 2022-10-16 RX ADMIN — Medication 50 GM/HR: at 02:20

## 2022-10-16 RX ADMIN — Medication 200 MILLIGRAM(S): at 16:45

## 2022-10-16 NOTE — DISCHARGE NOTE OB - PROVIDER TOKENS
PROVIDER:[TOKEN:[[id number]],FOLLOWUP:[1 week],ESTABLISHEDPATIENT:[T]] PROVIDER:[TOKEN:[[id number]],FOLLOWUP:[1-3 days]]

## 2022-10-16 NOTE — DISCHARGE NOTE OB - MEDICATION SUMMARY - MEDICATIONS TO TAKE
I will START or STAY ON the medications listed below when I get home from the hospital:    blood pressure kit  -- Apply on skin to affected area 3 times a day   -- Indication: For for at home BP monitoring    ibuprofen 600 mg oral tablet  -- 1 tab(s) by mouth every 6 hours, As needed, Moderate Pain (4 - 6)  -- Indication: For for pain    acetaminophen 500 mg oral tablet  -- 2 tab(s) by mouth every 6 hours   -- This product contains acetaminophen.  Do not use  with any other product containing acetaminophen to prevent possible liver damage.    -- Indication: For for pain    metFORMIN 500 mg oral tablet  -- 1 tab(s) by mouth 2 times a day   -- Check with your doctor before becoming pregnant.  Do not drink alcoholic beverages when taking this medication.  It is very important that you take or use this exactly as directed.  Do not skip doses or discontinue unless directed by your doctor.  Obtain medical advice before taking any non-prescription drugs as some may affect the action of this medication.  Take with food or milk.    -- Indication: For Gluvose control    Procardia XL 60 mg oral tablet, extended release  -- 1 tab(s) by mouth once a day   -- Avoid grapefruit and grapefruit juice while taking this medication.  It is very important that you take or use this exactly as directed.  Do not skip doses or discontinue unless directed by your doctor.  Some non-prescription drugs may aggravate your condition.  Read all labels carefully.  If a warning appears, check with your doctor before taking.  Swallow whole.  Do not crush.    -- Indication: For for HTN    Colace 2-in-1 50 mg-8.6 mg oral tablet  -- 1 tab(s) by mouth 2 times a day   -- Medication should be taken with plenty of water.    -- Indication: For for bowel movement    simethicone 80 mg oral tablet, chewable  -- 1 tab(s) by mouth every 4 hours  -- Indication: For for gas pain   I will START or STAY ON the medications listed below when I get home from the hospital:    blood pressure kit  -- Apply on skin to affected area 3 times a day   -- Indication: For for at home BP monitoring    ibuprofen 600 mg oral tablet  -- 1 tab(s) by mouth every 6 hours, As needed, Moderate Pain (4 - 6)  -- Indication: For for pain    acetaminophen 500 mg oral tablet  -- 2 tab(s) by mouth every 6 hours   -- This product contains acetaminophen.  Do not use  with any other product containing acetaminophen to prevent possible liver damage.    -- Indication: For for pain    metFORMIN 500 mg oral tablet  -- 1 tab(s) by mouth 2 times a day   -- Check with your doctor before becoming pregnant.  Do not drink alcoholic beverages when taking this medication.  It is very important that you take or use this exactly as directed.  Do not skip doses or discontinue unless directed by your doctor.  Obtain medical advice before taking any non-prescription drugs as some may affect the action of this medication.  Take with food or milk.    -- Indication: For Gluvose control    labetalol 200 mg oral tablet  -- 1 tab(s) by mouth every 8 hours   -- It is very important that you take or use this exactly as directed.  Do not skip doses or discontinue unless directed by your doctor.  May cause drowsiness or dizziness.  Some non-prescription drugs may aggravate your condition.  Read all labels carefully.  If a warning appears, check with your doctor before taking.    -- Indication: For Preeclampsia, severe, third trimester    Procardia XL 60 mg oral tablet, extended release  -- 1 tab(s) by mouth once a day   -- Avoid grapefruit and grapefruit juice while taking this medication.  It is very important that you take or use this exactly as directed.  Do not skip doses or discontinue unless directed by your doctor.  Some non-prescription drugs may aggravate your condition.  Read all labels carefully.  If a warning appears, check with your doctor before taking.  Swallow whole.  Do not crush.    -- Indication: For Preeclampsia, severe, third trimester    lanolin topical ointment  -- 1 application on skin every 6 hours, As needed, nipple soreness  -- Indication: For Nipple soreness    Colace 2-in-1 50 mg-8.6 mg oral tablet  -- 1 tab(s) by mouth 2 times a day   -- Medication should be taken with plenty of water.    -- Indication: For for bowel movement    simethicone 80 mg oral tablet, chewable  -- 1 tab(s) by mouth every 4 hours  -- Indication: For for gas pain

## 2022-10-16 NOTE — DISCHARGE NOTE OB - ADDITIONAL INSTRUCTIONS
No sex, no pushing, no heavy lifting, no strenuous activity, Nothing per vagina x  6 weeks - no sex, tampons, douching, tub baths, etc. Continue breastfeeding.  Motrin as needed for pain. Follow up in office in 5-6 weeks for post partum check up. Please call for appt.  FOLLOW UP IN 2-3DAYS FOR BP CHECK

## 2022-10-16 NOTE — DISCHARGE NOTE OB - NS MD DC FALL RISK RISK
For information on Fall & Injury Prevention, visit: [email or web address] OR  [email or web address] OR  https://www.cdc.gov/steadi/patient.html

## 2022-10-16 NOTE — DISCHARGE NOTE OB - HOSPITAL COURSE
33 yo F, , admitted for fetal tachycardia, high glucose, preeclampsia with severe features   @37wks, uncomplicated delivery. post partum care complicated by elevated blood pressure  s/p 24 hours magnesium, IV labetalol. Endocrinology consult appreciated. Case management consulted.  33 yo F, , admitted for fetal tachycardia, high glucose, preeclampsia with severe features   @37wks, uncomplicated delivery. post partum care complicated by elevated blood pressure  s/p 24 hours magnesium, IV labetalol. Endocrinology consult appreciated. Case management consulted.   bp now controlled on procardia 60xl daily and labetalol 200mg q8hr

## 2022-10-16 NOTE — CHART NOTE - NSCHARTNOTEFT_GEN_A_CORE
RN notified of elevated blood pressure, 160/90  Patient is seen at bedside, denies HA, blurry vision, DTRs2+ bilaterally.   Labetalol 20 mg IV push given    ICU Vital Signs Last 24 Hrs  T(C): 36.4 (16 Oct 2022 09:05), Max: 37.2 (15 Oct 2022 17:15)  T(F): 97.5 (16 Oct 2022 09:05), Max: 99 (15 Oct 2022 17:15)  HR: 61 (16 Oct 2022 15:05) (61 - 77)  BP: 157/84 (16 Oct 2022 15:05) (130/74 - 177/87)  BP(mean): 113 (16 Oct 2022 12:21) (11 - 123)  RR: 17 (16 Oct 2022 15:05) (16 - 18)  SpO2: 99% (16 Oct 2022 15:05) (93% - 100%)    O2 Parameters below as of 16 Oct 2022 15:05  Patient On (Oxygen Delivery Method): room air

## 2022-10-16 NOTE — DISCHARGE NOTE OB - CARE PLAN
1 Principal Discharge DX:	 (normal spontaneous vaginal delivery)  Assessment and plan of treatment:	routine vaginal delivery care, no tub baths, douching or sex for 6weeks, ambulate daily   follow up in 5-6wks with own obgyn  Secondary Diagnosis:	GDM, class A2  Assessment and plan of treatment:	Endocrinology, Dr. Stein consulted, metformin 500 prescribed twice daily  lifestyle changes-diet discussed with Dr. Stein  Follow up with [facility]/primary care physician for glucose check in 1 week  Secondary Diagnosis:	Preeclampsia, severe, third trimester  Assessment and plan of treatment:	s/p magnesium x24 hours  follow-up with clinician in 1 week for blood pressure check, return to Er if headache, epigastric pain, visual changes or increased swelling  Continue labetalol 60 XL daily   Principal Discharge DX:	 (normal spontaneous vaginal delivery)  Assessment and plan of treatment:	No sex, no pushing, no heavy lifting, no strenuous activity, Nothing per vagina x  6 weeks - no sex, tampons, douching, tub baths, etc. Continue breastfeeding.  Motrin as needed for pain. Follow up in office in 5-6 weeks for post partum check up. Please call for appt.  Secondary Diagnosis:	GDM, class A2  Assessment and plan of treatment:	Endocrinology, Dr. Stein consulted, metformin 500 prescribed twice daily  lifestyle changes-diet discussed with Dr. Stein  Follow up with [facility]/primary care physician for glucose check in 1 week  Secondary Diagnosis:	Preeclampsia, severe, third trimester  Assessment and plan of treatment:	s/p magnesium x24 hours  see obstetrician in 2-3days for blood pressure check  cont to monitor blood pressure 4-5x per day and record readings   return to hospital if you experience bp >150/>100 or if you experience headache, visual changes, chest pain, shortness of breath, abdominal pain, leg pain/swelling, weakness, dizziness or any other complaints  Continue procardia 60 XL daily and labetalol 200mg q8hr

## 2022-10-16 NOTE — DISCHARGE NOTE OB - MATERIALS PROVIDED
Vaccinations/[facility]  Screening Program/  Immunization Record/Breastfeeding Log/Bottle Feeding Log/Breastfeeding Mother’s Support Group Information/Guide to Postpartum Care/[facility] Hearing Screen Program/Back To Sleep Handout/Shaken Baby Prevention Handout/Breastfeeding Guide and Packet/Birth Certificate Instructions/Discharge Medication Information for Patients and Families Pocket Guide

## 2022-10-16 NOTE — DISCHARGE NOTE OB - PLAN OF CARE
s/p magnesium x24 hours  follow-up with clinician in 1 week for blood pressure check, return to Er if headache, epigastric pain, visual changes or increased swelling  Continue labetalol 60 XL daily routine vaginal delivery care, no tub baths, douching or sex for 6weeks, ambulate daily   follow up in 5-6wks with own obgyn Endocrinology, Dr. Stein consulted, metformin 500 prescribed twice daily  lifestyle changes-diet discussed with Dr. Stein  Follow up with [facility]/primary care physician for glucose check in 1 week s/p magnesium x24 hours  see obstetrician in 2-3days for blood pressure check  cont to monitor blood pressure 4-5x per day and record readings   return to hospital if you experience bp >150/>100 or if you experience headache, visual changes, chest pain, shortness of breath, abdominal pain, leg pain/swelling, weakness, dizziness or any other complaints  Continue procardia 60 XL daily and labetalol 200mg q8hr No sex, no pushing, no heavy lifting, no strenuous activity, Nothing per vagina x  6 weeks - no sex, tampons, douching, tub baths, etc. Continue breastfeeding.  Motrin as needed for pain. Follow up in office in 5-6 weeks for post partum check up. Please call for appt.

## 2022-10-16 NOTE — DISCHARGE NOTE OB - CARE PROVIDER_API CALL
Lorena Eldridge)  Obstetrics and Gynecology  [address], [address]  [place] [address]  Phone: [phone]  Fax: [phone]  Established Patient  Follow Up Time: 1 week   Darya Shore)  Obstetrics and Gynecology  [address] [place] [address]  Phone: [phone]  Fax: [phone]  Follow Up Time: 1-3 days

## 2022-10-16 NOTE — DISCHARGE NOTE OB - MEDICATION SUMMARY - MEDICATIONS TO STOP TAKING
I will STOP taking the medications listed below when I get home from the hospital:    amoxicillin-clavulanate 875 mg-125 mg oral tablet  -- 1 milligram(s) by mouth 2 times a day  -- Finish all this medication unless otherwise directed by prescriber.  Take with food or milk.     mg oral tablet  -- 1 tab(s) by mouth every 6 hours  -- Do not take this drug if you are pregnant.  It is very important that you take or use this exactly as directed.  Do not skip doses or discontinue unless directed by your doctor.  May cause drowsiness or dizziness.  Obtain medical advice before taking any non-prescription drugs as some may affect the action of this medication.  Take with food or milk.

## 2022-10-16 NOTE — DISCHARGE NOTE OB - PATIENT PORTAL LINK FT
You can access the FollowMyHealth Patient Portal offered by [facility] by registering at the following website: [email or web address]. By joining [employer]’s FollowMyHealth portal, you will also be able to view your health information using other applications (apps) compatible with our system.

## 2022-10-17 DIAGNOSIS — O14.13 SEVERE PRE-ECLAMPSIA, THIRD TRIMESTER: ICD-10-CM

## 2022-10-17 DIAGNOSIS — B95.1 STREPTOCOCCUS, GROUP B, AS THE CAUSE OF DISEASES CLASSIFIED ELSEWHERE: ICD-10-CM

## 2022-10-17 DIAGNOSIS — O24.419 GESTATIONAL DIABETES MELLITUS IN PREGNANCY, UNSPECIFIED CONTROL: ICD-10-CM

## 2022-10-17 DIAGNOSIS — Z34.93 ENCOUNTER FOR SUPERVISION OF NORMAL PREGNANCY, UNSPECIFIED, THIRD TRIMESTER: ICD-10-CM

## 2022-10-17 DIAGNOSIS — Z3A.37 37 WEEKS GESTATION OF PREGNANCY: ICD-10-CM

## 2022-10-17 LAB
ALBUMIN SERPL ELPH-MCNC: 3.2 G/DL
ALP BLD-CCNC: 220 U/L
ALT SERPL-CCNC: 11 U/L
ANION GAP SERPL CALC-SCNC: 10 MMOL/L
AST SERPL-CCNC: 17 U/L
BACTERIA UR CULT: NORMAL
BASOPHILS # BLD AUTO: 0.05 K/UL
BASOPHILS NFR BLD AUTO: 0.7 %
BILIRUB SERPL-MCNC: 0.3 MG/DL
BUN SERPL-MCNC: 8 MG/DL
CALCIUM SERPL-MCNC: 9.4 MG/DL
CHLORIDE SERPL-SCNC: 106 MMOL/L
CO2 SERPL-SCNC: 23 MMOL/L
CREAT SERPL-MCNC: 0.86 MG/DL
CREAT SPEC-SCNC: 130 MG/DL
CREAT/PROT UR: 0.3 RATIO
EGFR: 91 ML/MIN/1.73M2
EOSINOPHIL # BLD AUTO: 0.07 K/UL
EOSINOPHIL NFR BLD AUTO: 1 %
FIBRINOGEN PPP COAG.DERIVED-MCNC: 699 MG/DL
GLUCOSE SERPL-MCNC: 151 MG/DL
HCT VFR BLD CALC: 35.3 %
HGB BLD-MCNC: 11.4 G/DL
IMM GRANULOCYTES NFR BLD AUTO: 0.6 %
INR PPP: 0.88 RATIO
LYMPHOCYTES # BLD AUTO: 1.92 K/UL
LYMPHOCYTES NFR BLD AUTO: 27.7 %
MAN DIFF?: NORMAL
MCHC RBC-ENTMCNC: 29.5 PG
MCHC RBC-ENTMCNC: 32.3 GM/DL
MCV RBC AUTO: 91.2 FL
MONOCYTES # BLD AUTO: 0.46 K/UL
MONOCYTES NFR BLD AUTO: 6.6 %
NEUTROPHILS # BLD AUTO: 4.39 K/UL
NEUTROPHILS NFR BLD AUTO: 63.4 %
PLATELET # BLD AUTO: 220 K/UL
POTASSIUM SERPL-SCNC: 4.1 MMOL/L
PROT SERPL-MCNC: 5.7 G/DL
PROT UR-MCNC: 39 MG/DL
PT BLD: 10.4 SEC
RBC # BLD: 3.87 M/UL
RBC # FLD: 14.9 %
SODIUM SERPL-SCNC: 139 MMOL/L
URATE SERPL-MCNC: 5.9 MG/DL
WBC # FLD AUTO: 6.93 K/UL

## 2022-10-17 RX ORDER — SENNOSIDES/DOCUSATE SODIUM 8.6MG-50MG
1 TABLET ORAL
Qty: 20 | Refills: 0
Start: 2022-10-17 | End: 2022-10-26

## 2022-10-17 RX ORDER — METFORMIN HYDROCHLORIDE 850 MG/1
1 TABLET ORAL
Qty: 60 | Refills: 0
Start: 2022-10-17 | End: 2022-11-15

## 2022-10-17 RX ORDER — IBUPROFEN 200 MG
1 TABLET ORAL
Qty: 20 | Refills: 2
Start: 2022-10-17 | End: 2022-10-31

## 2022-10-17 RX ORDER — NIFEDIPINE 30 MG
1 TABLET, EXTENDED RELEASE 24 HR ORAL
Qty: 30 | Refills: 0
Start: 2022-10-17 | End: 2022-11-15

## 2022-10-17 RX ORDER — ACETAMINOPHEN 500 MG
2 TABLET ORAL
Qty: 56 | Refills: 0
Start: 2022-10-17 | End: 2022-10-23

## 2022-10-17 RX ORDER — SIMETHICONE 80 MG/1
1 TABLET, CHEWABLE ORAL
Qty: 60 | Refills: 0
Start: 2022-10-17 | End: 2022-10-26

## 2022-10-17 RX ADMIN — Medication 60 MILLIGRAM(S): at 12:58

## 2022-10-17 RX ADMIN — SODIUM CHLORIDE 3 MILLILITER(S): 9 INJECTION INTRAMUSCULAR; INTRAVENOUS; SUBCUTANEOUS at 06:05

## 2022-10-17 RX ADMIN — Medication 975 MILLIGRAM(S): at 16:20

## 2022-10-17 RX ADMIN — SODIUM CHLORIDE 3 MILLILITER(S): 9 INJECTION INTRAMUSCULAR; INTRAVENOUS; SUBCUTANEOUS at 23:47

## 2022-10-17 RX ADMIN — Medication 200 MILLIGRAM(S): at 05:54

## 2022-10-17 RX ADMIN — Medication 975 MILLIGRAM(S): at 22:04

## 2022-10-17 RX ADMIN — Medication 975 MILLIGRAM(S): at 15:49

## 2022-10-17 RX ADMIN — Medication 2: at 11:49

## 2022-10-17 RX ADMIN — Medication 975 MILLIGRAM(S): at 03:08

## 2022-10-17 RX ADMIN — Medication 975 MILLIGRAM(S): at 22:36

## 2022-10-17 RX ADMIN — Medication 200 MILLIGRAM(S): at 22:04

## 2022-10-17 RX ADMIN — SODIUM CHLORIDE 3 MILLILITER(S): 9 INJECTION INTRAMUSCULAR; INTRAVENOUS; SUBCUTANEOUS at 14:11

## 2022-10-17 RX ADMIN — Medication 2: at 08:07

## 2022-10-17 RX ADMIN — Medication 200 MILLIGRAM(S): at 14:01

## 2022-10-17 RX ADMIN — Medication 975 MILLIGRAM(S): at 03:38

## 2022-10-17 NOTE — CONSULT NOTE ADULT - PROBLEM SELECTOR RECOMMENDATION 9
hyperglycemic post partum- requiring admelog  a1c- 7.2%  add metformin 500 bid  lifestyle chnages  fsg monitoring  fine tuning as out pt  d/w prim team

## 2022-10-17 NOTE — LACTATION INITIAL EVALUATION - LACTATION INTERVENTIONS
Breastfeeding on cue 8-12X/24 hours with diaper count to assess for adequate intake, safe skin to skin and rooming-in encouraged.  Attended mother-baby breastfeeding and d/c class today/initiate/review safe skin-to-skin/initiate/review hand expression/initiate/review pumping guidelines and safe milk handling/initiate/review techniques for position and latch/post discharge community resources provided/initiate/review supplementation plan due to medical indications/review techniques to increase milk supply/review techniques to manage sore nipples/engorgement/reviewed components of an effective feeding and at least 8 effective feedings per day required/reviewed importance of monitoring infant diapers, the breastfeeding log, and minimum output each day/reviewed benefits and recommendations for rooming in/reviewed feeding on demand/by cue at least 8 times a day/reviewed indications of inadequate milk transfer that would require supplementation

## 2022-10-17 NOTE — LACTATION INITIAL EVALUATION - AS DELIV COMPLICATIONS OB
fetal tachycardia; unconfirmed fever at home; high glucose; PEC with severe features; see CPN/abnormal fetal heart rate tracing/pre eclampsia

## 2022-10-17 NOTE — CONSULT NOTE ADULT - SUBJECTIVE AND OBJECTIVE BOX
Patient is a 32y old  Female who presents with a chief complaint of GDMA2- on insulin soon after dx of gdm around 16 wks . Now s/p vaginal delivery with hyperglycemia.       PAST MEDICAL & SURGICAL HISTORY:  No pertinent past medical history             MEDICATIONS  (STANDING):  acetaminophen     Tablet .. 975 milliGRAM(s) Oral <User Schedule>  dextrose 50% Injectable 25 Gram(s) IV Push once  dextrose 50% Injectable 12.5 Gram(s) IV Push once  dextrose 50% Injectable 25 Gram(s) IV Push once  diphtheria/tetanus/pertussis (acellular) Vaccine (ADAcel) 0.5 milliLiter(s) IntraMuscular once  glucagon  Injectable 1 milliGRAM(s) IntraMuscular once  ibuprofen  Tablet. 600 milliGRAM(s) Oral every 6 hours  insulin lispro (ADMELOG) corrective regimen sliding scale   SubCutaneous three times a day before meals  insulin lispro (ADMELOG) corrective regimen sliding scale   SubCutaneous at bedtime  labetalol 200 milliGRAM(s) Oral every 8 hours  lactated ringers. 1000 milliLiter(s) (75 mL/Hr) IV Continuous <Continuous>  magnesium sulfate Infusion 2 Gm/Hr (50 mL/Hr) IV Continuous <Continuous>  NIFEdipine XL 60 milliGRAM(s) Oral every 24 hours  oxytocin Infusion 333.333 milliUNIT(s)/Min (1000 mL/Hr) IV Continuous <Continuous>  sodium chloride 0.9% lock flush 3 milliLiter(s) IV Push every 8 hours    MEDICATIONS  (PRN):  benzocaine 20%/menthol 0.5% Spray 1 Spray(s) Topical every 6 hours PRN for Perineal discomfort  dextrose Oral Gel 15 Gram(s) Oral once PRN Blood Glucose LESS THAN 70 milliGRAM(s)/deciliter  dibucaine 1% Ointment 1 Application(s) Topical every 6 hours PRN Perineal discomfort  diphenhydrAMINE 25 milliGRAM(s) Oral every 6 hours PRN Pruritus  hydrocortisone 1% Cream 1 Application(s) Topical every 6 hours PRN Moderate Pain (4-6)  ibuprofen  Tablet. 600 milliGRAM(s) Oral every 6 hours PRN Moderate Pain (4 - 6)  lanolin Ointment 1 Application(s) Topical every 6 hours PRN nipple soreness  magnesium hydroxide Suspension 30 milliLiter(s) Oral two times a day PRN Constipation  oxyCODONE    IR 5 milliGRAM(s) Oral every 3 hours PRN Moderate to Severe Pain (4-10)  oxyCODONE    IR 5 milliGRAM(s) Oral once PRN Moderate to Severe Pain (4-10)  pramoxine 1%/zinc 5% Cream 1 Application(s) Topical every 4 hours PRN Moderate Pain (4-6)  simethicone 80 milliGRAM(s) Chew every 4 hours PRN Gas  witch hazel Pads 1 Application(s) Topical every 4 hours PRN Perineal discomfort      FAMILY HISTORY:      SOCIAL HISTORY:      REVIEW OF SYSTEMS:  CONSTITUTIONAL: No fever, weight loss, or fatigue  EYES: No eye pain, visual disturbances, or discharge  ENT:  No difficulty hearing, tinnitus, vertigo; No sinus or throat pain  NECK: No pain or stiffness  RESPIRATORY: No cough, wheezing, chills or hemoptysis; No Shortness of Breath  CARDIOVASCULAR: No chest pain, palpitations, passing out, dizziness, or leg swelling  GASTROINTESTINAL: No abdominal or epigastric pain. No nausea, vomiting, or hematemesis; No diarrhea or constipation. No melena or hematochezia.  GENITOURINARY: No dysuria, frequency, hematuria, or incontinence  NEUROLOGICAL: No headaches, memory loss, loss of strength, numbness, or tremors  SKIN: No itching, burning, rashes, or lesions   LYMPH Nodes: No enlarged glands  ENDOCRINE: No heat or cold intolerance; No hair loss  MUSCULOSKELETAL: No joint pain or swelling; No muscle, back, or extremity pain  PSYCHIATRIC: No depression, anxiety, mood swings, or difficulty sleeping  HEME/LYMPH: No easy bruising, or bleeding gums  ALLERGY AND IMMUNOLOGIC: No hives or eczema	        Vital Signs Last 24 Hrs  T(C): 36.9 (17 Oct 2022 09:00), Max: 37.3 (17 Oct 2022 04:12)  T(F): 98.5 (17 Oct 2022 09:00), Max: 99.1 (17 Oct 2022 04:12)  HR: 65 (17 Oct 2022 09:00) (60 - 80)  BP: 133/72 (17 Oct 2022 09:00) (121/72 - 171/89)  BP(mean): 101 (17 Oct 2022 00:00) (101 - 123)  RR: 17 (17 Oct 2022 09:00) (16 - 18)  SpO2: 98% (17 Oct 2022 09:00) (97% - 100%)    Parameters below as of 17 Oct 2022 09:00  Patient On (Oxygen Delivery Method): room air          Constitutional:    NC/AT: nad    Neck:  No JVD, bruits or thyromegaly    Respiratory:  Clear without rales or rhonchi    Cardiovascular:  RR without murmur, rub or gallop.    Gastrointestinal: Soft without hepatosplenomegaly.    Extremities: without cyanosis, clubbing or edema.    Neurological:  Oriented   x   3   . No gross sensory or motor defects.        LABS:                        10.8   9.08  )-----------( 194      ( 16 Oct 2022 07:17 )             32.2     10-16    139  |  109<H>  |  5<L>  ----------------------------<  147<H>  3.7   |  21<L>  |  0.82    Ca    7.3<L>      16 Oct 2022 07:17  Mg     5.8     10-16    TPro  5.4<L>  /  Alb  1.7<L>  /  TBili  0.3  /  DBili  x   /  AST  15  /  ALT  11  /  AlkPhos  179<H>  10-16        PT/INR - ( 16 Oct 2022 00:22 )   PT: 10.1 sec;   INR: 0.85 ratio         PTT - ( 16 Oct 2022 00:22 )  PTT:24.4 sec    CAPILLARY BLOOD GLUCOSE      POCT Blood Glucose.: 152 mg/dL (17 Oct 2022 07:56)  POCT Blood Glucose.: 224 mg/dL (16 Oct 2022 22:03)  POCT Blood Glucose.: 160 mg/dL (16 Oct 2022 16:25)  POCT Blood Glucose.: 220 mg/dL (16 Oct 2022 11:53)      RADIOLOGY & ADDITIONAL STUDIES:

## 2022-10-17 NOTE — PROGRESS NOTE ADULT - PROBLEM SELECTOR PLAN 3
-continue Procardia 60 XL   -Endocrinology consult   -fingersticks qac qhs  -Breastfeeding encouraged   -possible discharge home later today if BP controlled   -case management consult   -d/w dr. العراقي

## 2022-10-17 NOTE — PROGRESS NOTE ADULT - SUBJECTIVE AND OBJECTIVE BOX
Patient seen at bedside resting comfortably offers no current complaints.  Ambulating and voiding without difficulty.  Passing flatus and tolerating regular diet.  both breast/bottle feeding.  Denies HA, CP, SOB, N/V/D, dizziness, palpitations, worsening abdominal pain, worsening vaginal bleeding, or any other concerns.    Vital Signs Last 24 Hrs  T(C): 37.3 (17 Oct 2022 04:12), Max: 37.3 (17 Oct 2022 04:12)  T(F): 99.1 (17 Oct 2022 04:12), Max: 99.1 (17 Oct 2022 04:12)  HR: 67 (17 Oct 2022 04:12) (60 - 80)  BP: 127/58 (17 Oct 2022 04:12) (121/72 - 171/89)  BP(mean): 101 (17 Oct 2022 00:00) (101 - 123)  RR: 17 (17 Oct 2022 04:12) (16 - 18)  SpO2: 98% (17 Oct 2022 04:12) (96% - 100%)    Parameters below as of 17 Oct 2022 00:00  Patient On (Oxygen Delivery Method): room air        Physical Exam:     Gen: A&Ox 3, NAD  Chest: CTA B/L  Cardiac: S1+S2; RRR  Breast: Soft, nontender, nonengorged  Abdomen: +BS; Soft, nontender, ND; Fundus firm below umbilicus  Gyn: Min lochia  Ext: Nontender, DTRS 2+, no worsening edema                           10.8   9.08  )-----------( 194      ( 16 Oct 2022 07:17 )             32.2      10-16    139  |  109<H>  |  5<L>  ----------------------------<  147<H>  3.7   |  21<L>  |  0.82    Ca    7.3<L>      16 Oct 2022 07:17  Mg     5.8     10-16    TPro  5.4<L>  /  Alb  1.7<L>  /  TBili  0.3  /  DBili  x   /  AST  15  /  ALT  11  /  AlkPhos  179<H>  10-16                   A/P: 27 year old PPD#2 s/p , preeclampsia with severe features, GDMA2  -continue Procardia 60 XL   -Endocrinology consult   -fingersticks qac qhs  -Breastfeeding encouraged   -possible discharge home later today if BP controlled   -case management consult   -d/w dr. العراقي

## 2022-10-17 NOTE — CONSULT NOTE ADULT - ASSESSMENT
Patient is a 32y old  Female who presents with a chief complaint of GDMA2- on insulin soon after dx of gdm around 16 wks . Now s/p vaginal delivery with hyperglycemia.

## 2022-10-18 VITALS
SYSTOLIC BLOOD PRESSURE: 130 MMHG | OXYGEN SATURATION: 100 % | DIASTOLIC BLOOD PRESSURE: 83 MMHG | HEART RATE: 83 BPM | RESPIRATION RATE: 18 BRPM

## 2022-10-18 PROCEDURE — 83735 ASSAY OF MAGNESIUM: CPT

## 2022-10-18 PROCEDURE — 87040 BLOOD CULTURE FOR BACTERIA: CPT

## 2022-10-18 PROCEDURE — 86900 BLOOD TYPING SEROLOGIC ABO: CPT

## 2022-10-18 PROCEDURE — 86140 C-REACTIVE PROTEIN: CPT

## 2022-10-18 PROCEDURE — 82962 GLUCOSE BLOOD TEST: CPT

## 2022-10-18 PROCEDURE — 86923 COMPATIBILITY TEST ELECTRIC: CPT

## 2022-10-18 PROCEDURE — 85652 RBC SED RATE AUTOMATED: CPT

## 2022-10-18 PROCEDURE — 86850 RBC ANTIBODY SCREEN: CPT

## 2022-10-18 PROCEDURE — 83036 HEMOGLOBIN GLYCOSYLATED A1C: CPT

## 2022-10-18 PROCEDURE — 85384 FIBRINOGEN ACTIVITY: CPT

## 2022-10-18 PROCEDURE — 82010 KETONE BODYS QUAN: CPT

## 2022-10-18 PROCEDURE — 87086 URINE CULTURE/COLONY COUNT: CPT

## 2022-10-18 PROCEDURE — 83605 ASSAY OF LACTIC ACID: CPT

## 2022-10-18 PROCEDURE — 88307 TISSUE EXAM BY PATHOLOGIST: CPT

## 2022-10-18 PROCEDURE — 83615 LACTATE (LD) (LDH) ENZYME: CPT

## 2022-10-18 PROCEDURE — 81001 URINALYSIS AUTO W/SCOPE: CPT

## 2022-10-18 PROCEDURE — G0463: CPT

## 2022-10-18 PROCEDURE — 59025 FETAL NON-STRESS TEST: CPT

## 2022-10-18 PROCEDURE — 0225U NFCT DS DNA&RNA 21 SARSCOV2: CPT

## 2022-10-18 PROCEDURE — 85730 THROMBOPLASTIN TIME PARTIAL: CPT

## 2022-10-18 PROCEDURE — 36415 COLL VENOUS BLD VENIPUNCTURE: CPT

## 2022-10-18 PROCEDURE — 84550 ASSAY OF BLOOD/URIC ACID: CPT

## 2022-10-18 PROCEDURE — 85610 PROTHROMBIN TIME: CPT

## 2022-10-18 PROCEDURE — 59050 FETAL MONITOR W/REPORT: CPT

## 2022-10-18 PROCEDURE — 84156 ASSAY OF PROTEIN URINE: CPT

## 2022-10-18 PROCEDURE — 86780 TREPONEMA PALLIDUM: CPT

## 2022-10-18 PROCEDURE — 86901 BLOOD TYPING SEROLOGIC RH(D): CPT

## 2022-10-18 PROCEDURE — 85027 COMPLETE CBC AUTOMATED: CPT

## 2022-10-18 PROCEDURE — 80053 COMPREHEN METABOLIC PANEL: CPT

## 2022-10-18 PROCEDURE — 85025 COMPLETE CBC W/AUTO DIFF WBC: CPT

## 2022-10-18 RX ORDER — LANOLIN
1 OINTMENT (GRAM) TOPICAL
Qty: 1 | Refills: 0
Start: 2022-10-18

## 2022-10-18 RX ORDER — LABETALOL HCL 100 MG
1 TABLET ORAL
Qty: 90 | Refills: 0
Start: 2022-10-18 | End: 2022-11-16

## 2022-10-18 RX ADMIN — SODIUM CHLORIDE 3 MILLILITER(S): 9 INJECTION INTRAMUSCULAR; INTRAVENOUS; SUBCUTANEOUS at 14:00

## 2022-10-18 RX ADMIN — Medication 975 MILLIGRAM(S): at 08:18

## 2022-10-18 RX ADMIN — SODIUM CHLORIDE 3 MILLILITER(S): 9 INJECTION INTRAMUSCULAR; INTRAVENOUS; SUBCUTANEOUS at 06:58

## 2022-10-18 RX ADMIN — Medication 600 MILLIGRAM(S): at 06:46

## 2022-10-18 RX ADMIN — Medication 200 MILLIGRAM(S): at 06:46

## 2022-10-18 RX ADMIN — Medication 600 MILLIGRAM(S): at 07:20

## 2022-10-18 RX ADMIN — Medication 975 MILLIGRAM(S): at 08:50

## 2022-10-18 RX ADMIN — Medication 200 MILLIGRAM(S): at 14:17

## 2022-10-18 RX ADMIN — Medication 60 MILLIGRAM(S): at 12:33

## 2022-10-18 NOTE — PROGRESS NOTE ADULT - ASSESSMENT
A/P:  33yo miol for fetal tachycardia with subjective fever at home, uncontrolled gdma2, noted to have preeclampsia with severe features (headahe and bp) s/p magnesium sulfate x24hrs, PPD#3 s/p  @ 37wks, pt stable  - cont close monitoring   - endocrine recommendations appreciated, to be discharged home on metformin 500mg bid and follow up with primary care   - procardia 60xl and labetalol 200mg q8hr, overnight bp noted to be elevated to 150/80s will cont to monitor, may require higher dose of labetalol   - case management   - cont to monitor fingersticks and iss as needed   - encourage ambulation   -Breastfeeding encouraged   - discharge planning   -d/w Dr. nunes, house attending 
PA NOTE:  ppd#0 s/p  37wks  uncontrolled gdma2  on sliding scale ac/hs post delivery-endo consult in am  induced for pec w/ severe fx: BP on mgso4 2g/hr   mg level at 6pm 5.0  next full hellp labs at 12am w mg level  case management  on procardia 30xl started this afternoon  pt seen at bedside- at this time offers no acute complaints 
A/P: 27 year old PPD#2 s/p , preeclampsia with severe features, GDMA2

## 2022-10-18 NOTE — PROGRESS NOTE ADULT - PROBLEM SELECTOR PLAN 2
-continue Procardia 60 XL   -Endocrinology consult   -fingersticks qac qhs  -Breastfeeding encouraged   -possible discharge home later today if BP controlled   -case management consult   -d/w dr. العراقي
A/P:  33yo miol for fetal tachycardia with subjective fever at home, uncontrolled gdma2, noted to have preeclampsia with severe features (headahe and bp) s/p magnesium sulfate x24hrs, PPD#3 s/p  @ 37wks, pt stable  - cont close monitoring   - endocrine recommendations appreciated, to be discharged home on metformin 500mg bid and follow up with primary care   - procardia 60xl and labetalol 200mg q8hr, overnight bp noted to be elevated to 150/80s will cont to monitor, may require higher dose of labetalol   - case management   - cont to monitor fingersticks and iss as needed   - encourage ambulation   -Breastfeeding encouraged   - discharge planning   -d/w Dr. nunes, house attending

## 2022-10-18 NOTE — PROGRESS NOTE ADULT - SUBJECTIVE AND OBJECTIVE BOX
Patient seen at bedside resting comfortably offers no current complaints.  Ambulating and voiding without difficulty.  Passing flatus and tolerating regular diet. Pt both breast/bottle feeding.  Pt denies weakness, headache, chest pain, shortness of breath, N/V/D, dizziness, palpitations, worsening abdominal pain, worsening vaginal bleeding, or any other concerns.      Vital Signs Last 24 Hrs  T(C): 36.7 (18 Oct 2022 11:00), Max: 37.5 (17 Oct 2022 12:58)  T(F): 98.1 (18 Oct 2022 11:00), Max: 99.5 (17 Oct 2022 12:58)  HR: 78 (18 Oct 2022 11:00) (59 - 78)  BP: 124/84 (18 Oct 2022 11:00) (124/84 - 158/81)  BP(mean): --  RR: 17 (18 Oct 2022 11:00) (16 - 18)  SpO2: 98% (18 Oct 2022 11:00) (98% - 99%)    Parameters below as of 18 Oct 2022 11:00  Patient On (Oxygen Delivery Method): room air        Physical Exam:     Gen: A&Ox 3, NAD  Chest: CTA B/L  Cardiac: S1,S2; RRR  Breast: Soft, nontender, nonengorged  Abdomen: +BS; Soft, nontender, ND; Fundus firm below umbilicus  Gyn: Min lochia  Ext: Nontender, DTRS 2+, no worsening edema         A/P:  33yo miol for fetal tachycardia with subjective fever at home, uncontrolled gdma2, noted to have preeclampsia with severe features (headahe and bp) s/p magnesium sulfate x24hrs, PPD#3 s/p  @ 37wks, pt stable  - cont close monitoring   - endocrine recommendations appreciated, to be discharged home on metformin 500mg bid and follow up with primary care   - procardia 60xl and labetalol 200mg q8hr, overnight bp noted to be elevated to 150/80s will cont to monitor, may require higher dose of labetalol   - case management   - cont to monitor fingersticks and iss as needed   - encourage ambulation   -Breastfeeding encouraged   - discharge planning   -d/w Dr. nunes, house attending

## 2022-10-18 NOTE — PROGRESS NOTE ADULT - SUBJECTIVE AND OBJECTIVE BOX
Interval Events:  pt in nad    Allergies    No Known Allergies    Intolerances      Endocrine/Metabolic Medications:  dextrose 50% Injectable 25 Gram(s) IV Push once  dextrose 50% Injectable 12.5 Gram(s) IV Push once  dextrose 50% Injectable 25 Gram(s) IV Push once  dextrose Oral Gel 15 Gram(s) Oral once PRN  glucagon  Injectable 1 milliGRAM(s) IntraMuscular once  insulin lispro (ADMELOG) corrective regimen sliding scale   SubCutaneous three times a day before meals  insulin lispro (ADMELOG) corrective regimen sliding scale   SubCutaneous at bedtime      Vital Signs Last 24 Hrs  T(C): 36.8 (18 Oct 2022 06:46), Max: 37.5 (17 Oct 2022 12:58)  T(F): 98.2 (18 Oct 2022 06:46), Max: 99.5 (17 Oct 2022 12:58)  HR: 71 (18 Oct 2022 06:46) (59 - 71)  BP: 143/86 (18 Oct 2022 06:46) (143/86 - 158/81)  BP(mean): --  RR: 18 (18 Oct 2022 06:46) (16 - 18)  SpO2: 99% (18 Oct 2022 06:46) (98% - 99%)    Parameters below as of 18 Oct 2022 06:46  Patient On (Oxygen Delivery Method): room air          PHYSICAL EXAM  All physical exam findings normal, except those marked:  General:	Alert, active, cooperative, NAD, well hydrated  .		[] Abnormal:  Neck		Normal: supple, no cervical adenopathy, no palpable thyroid  .		[] Abnormal:  Cardiovascular	Normal: regular rate, normal S1, S2, no murmurs  .		[] Abnormal:  Respiratory	Normal: no chest wall deformity, normal respiratory pattern, CTA B/L  .		[] Abnormal:  Abdominal	Normal: soft, ND, NT, bowel sounds present, no masses, no organomegaly  .		[] Abnormal:  		Normal normal genitalia, testes descended, circumcised/uncircumcised  .		Amy stage:			Breast amy:  .		Menstrual history:  .		[] Abnormal:  Extremities	Normal: FROM x4  .		[] Abnormal:  Skin		Normal: intact and not indurated, no rash, no acanthosis nigricans  .		[] Abnormal:  Neurologic	Normal: grossly intact  .		[] Abnormal:    LABS        CAPILLARY BLOOD GLUCOSE      POCT Blood Glucose.: 140 mg/dL (18 Oct 2022 07:33)  POCT Blood Glucose.: 133 mg/dL (17 Oct 2022 22:07)  POCT Blood Glucose.: 126 mg/dL (17 Oct 2022 16:30)  POCT Blood Glucose.: 184 mg/dL (17 Oct 2022 11:12)        Assesment/plan    Patient is a 32y old  Female who presents with a chief complaint of GDMA2- on insulin soon after dx of gdm around 16 wks . Now s/p vaginal delivery with hyperglycemia.      Problem/Recommendation - 1:  ·  Problem: GDM, class A2.   ·  Recommendation: hyperglycemic post partum- requiring admelog  a1c- 7.2%  add metformin 500 bid  lifestyle changes d/w pt  fsg monitoring  fine tuning as out pt  d/w prim team.

## 2022-10-18 NOTE — PROGRESS NOTE ADULT - PROBLEM SELECTOR PLAN 1
A/P:  33yo miol for fetal tachycardia with subjective fever at home, uncontrolled gdma2, noted to have preeclampsia with severe features (headahe and bp) s/p magnesium sulfate x24hrs, PPD#3 s/p  @ 37wks, pt stable  - cont close monitoring   - endocrine recommendations appreciated, to be discharged home on metformin 500mg bid and follow up with primary care   - procardia 60xl and labetalol 200mg q8hr, overnight bp noted to be elevated to 150/80s will cont to monitor, may require higher dose of labetalol   - case management   - cont to monitor fingersticks and iss as needed   - encourage ambulation   -Breastfeeding encouraged   - discharge planning   -d/w Dr. nunes, house attending
-continue Procardia 60 XL   -Endocrinology consult   -fingersticks qac qhs  -Breastfeeding encouraged   -possible discharge home later today if BP controlled   -case management consult   -d/w dr. العراقي

## 2022-10-19 ENCOUNTER — FORM ENCOUNTER (OUTPATIENT)
Age: 32
End: 2022-10-19

## 2022-10-20 LAB
CULTURE RESULTS: SIGNIFICANT CHANGE UP
SPECIMEN SOURCE: SIGNIFICANT CHANGE UP

## 2022-10-21 ENCOUNTER — APPOINTMENT (OUTPATIENT)
Dept: ANTEPARTUM | Facility: CLINIC | Age: 32
End: 2022-10-21

## 2022-10-26 ENCOUNTER — NON-APPOINTMENT (OUTPATIENT)
Age: 32
End: 2022-10-26

## 2022-10-27 ENCOUNTER — NON-APPOINTMENT (OUTPATIENT)
Age: 32
End: 2022-10-27

## 2022-10-27 ENCOUNTER — APPOINTMENT (OUTPATIENT)
Dept: OBGYN | Facility: CLINIC | Age: 32
End: 2022-10-27

## 2022-11-28 ENCOUNTER — APPOINTMENT (OUTPATIENT)
Dept: OBGYN | Facility: CLINIC | Age: 32
End: 2022-11-28

## 2022-12-02 ENCOUNTER — APPOINTMENT (OUTPATIENT)
Dept: OBGYN | Facility: CLINIC | Age: 32
End: 2022-12-02

## 2022-12-02 ENCOUNTER — OUTPATIENT (OUTPATIENT)
Dept: OUTPATIENT SERVICES | Facility: HOSPITAL | Age: 32
LOS: 1 days | End: 2022-12-02
Payer: MEDICAID

## 2022-12-02 VITALS
HEIGHT: 68 IN | DIASTOLIC BLOOD PRESSURE: 101 MMHG | TEMPERATURE: 97.9 F | OXYGEN SATURATION: 100 % | HEART RATE: 81 BPM | BODY MASS INDEX: 26.07 KG/M2 | SYSTOLIC BLOOD PRESSURE: 154 MMHG | WEIGHT: 172 LBS

## 2022-12-02 VITALS — SYSTOLIC BLOOD PRESSURE: 142 MMHG | DIASTOLIC BLOOD PRESSURE: 90 MMHG

## 2022-12-02 DIAGNOSIS — Z34.90 ENCOUNTER FOR SUPERVISION OF NORMAL PREGNANCY, UNSPECIFIED, UNSPECIFIED TRIMESTER: ICD-10-CM

## 2022-12-02 DIAGNOSIS — O26.899 OTHER SPECIFIED PREGNANCY RELATED CONDITIONS, UNSPECIFIED TRIMESTER: ICD-10-CM

## 2022-12-02 DIAGNOSIS — Z67.91 OTHER SPECIFIED PREGNANCY RELATED CONDITIONS, UNSPECIFIED TRIMESTER: ICD-10-CM

## 2022-12-02 DIAGNOSIS — Z34.00 ENCOUNTER FOR SUPERVISION OF NORMAL FIRST PREGNANCY, UNSPECIFIED TRIMESTER: ICD-10-CM

## 2022-12-02 PROCEDURE — 99213 OFFICE O/P EST LOW 20 MIN: CPT

## 2022-12-02 PROCEDURE — G0463: CPT

## 2022-12-02 NOTE — HISTORY OF PRESENT ILLNESS
[Last Pap Date: ___] : Last Pap Date: [unfilled] [Delivery Date: ___] : on [unfilled] [] : delivered by vaginal delivery [Female] : Delivery History: baby girl [Wt. ___] : weighing [unfilled] [BF with Difficulty] : nursing with difficulty [Resumed Menses] : has resumed her menses [Resumed Bogard] : has resumed intercourse [Intended Contraception] : Intended Contraception: [Breastfeeding] : not currently nursing [Discharge HCT: ___] : hematocrit level was [unfilled] [Discharge HGB: ___] : hemoglobin level was [unfilled] [IUD] : intrauterine device [BreastFeeding Problems] : no breastfeeding problems [S/Sx PP Depression] : no signs/symptoms of postpartum depression [[place] Depression Scale ___ (0-30)] : [unfilled] [Back to Normal] : is back to normal in size [None] : no vaginal bleeding [Normal] : the vagina was normal [Healing Well] : is healing well [Examination Of The Breasts] : breasts are normal [FreeTextEntry9] : A2GDM, Rh neg [de-identified] : Induction of labor 37+ wks for fetal tachycardia, PEC with severe features [de-identified] : PP exam, never returned to office for BP check, states home care nurse told her she didn't have to. Pt discontinued Procardia 60mg xl daily and Labetalol 200mg 3x/day. BPs are elevated in office, 154/101, repeat manual 142/90. Pt states home BPs are usually 125/70s.  + GDM A2, advise 2hr GTT. Desires Paragard IUD, has had sexual relations but states menses ended yesterday and  is away for the week [de-identified] : Cardiology consult, referral provided, continue BP monitoring, to ER for 160/110. Pt advised to return to office next week for paragard placement, advise fasting 2 hr GTT next week

## 2022-12-05 ENCOUNTER — NON-APPOINTMENT (OUTPATIENT)
Age: 32
End: 2022-12-05

## 2022-12-05 ENCOUNTER — APPOINTMENT (OUTPATIENT)
Dept: OBGYN | Facility: CLINIC | Age: 32
End: 2022-12-05
Payer: MEDICAID

## 2022-12-05 ENCOUNTER — OUTPATIENT (OUTPATIENT)
Dept: OUTPATIENT SERVICES | Facility: HOSPITAL | Age: 32
LOS: 1 days | End: 2022-12-05
Payer: MEDICAID

## 2022-12-05 VITALS
DIASTOLIC BLOOD PRESSURE: 100 MMHG | WEIGHT: 170 LBS | HEIGHT: 68 IN | BODY MASS INDEX: 25.76 KG/M2 | RESPIRATION RATE: 16 BRPM | OXYGEN SATURATION: 100 % | HEART RATE: 68 BPM | SYSTOLIC BLOOD PRESSURE: 157 MMHG | TEMPERATURE: 97.5 F

## 2022-12-05 VITALS — SYSTOLIC BLOOD PRESSURE: 137 MMHG | DIASTOLIC BLOOD PRESSURE: 85 MMHG

## 2022-12-05 DIAGNOSIS — R03.0 ELEVATED BLOOD-PRESSURE READING, W/OUT DIAGNOSIS OF HYPERTENSION: ICD-10-CM

## 2022-12-05 DIAGNOSIS — Z00.00 ENCOUNTER FOR GENERAL ADULT MEDICAL EXAMINATION WITHOUT ABNORMAL FINDINGS: ICD-10-CM

## 2022-12-05 DIAGNOSIS — R03.0 ELEVATED BLOOD-PRESSURE READING, WITHOUT DIAGNOSIS OF HYPERTENSION: ICD-10-CM

## 2022-12-05 DIAGNOSIS — B95.1 STREPTOCOCCUS, GROUP B, AS THE CAUSE OF DISEASES CLASSIFIED ELSEWHERE: ICD-10-CM

## 2022-12-05 DIAGNOSIS — Z30.430 ENCOUNTER FOR INSERTION OF INTRAUTERINE CONTRACEPTIVE DEVICE: ICD-10-CM

## 2022-12-05 DIAGNOSIS — O09.299 SUPERVISION OF PREGNANCY WITH OTHER POOR REPRODUCTIVE OR OBSTETRIC HISTORY, UNSPECIFIED TRIMESTER: ICD-10-CM

## 2022-12-05 DIAGNOSIS — O24.419 GESTATIONAL DIABETES MELLITUS IN PREGNANCY, UNSPECIFIED CONTROL: ICD-10-CM

## 2022-12-05 LAB
C TRACH RRNA SPEC QL NAA+PROBE: NOT DETECTED
N GONORRHOEA RRNA SPEC QL NAA+PROBE: NOT DETECTED
SOURCE AMPLIFICATION: NORMAL

## 2022-12-05 PROCEDURE — 58300 INSERT INTRAUTERINE DEVICE: CPT

## 2022-12-05 PROCEDURE — ZZZZZ: CPT

## 2022-12-05 PROCEDURE — 82950 GLUCOSE TEST: CPT

## 2022-12-05 PROCEDURE — 82947 ASSAY GLUCOSE BLOOD QUANT: CPT

## 2022-12-05 PROCEDURE — 81025 URINE PREGNANCY TEST: CPT

## 2022-12-05 NOTE — PROCEDURE
[IUD Placement] : intrauterine device (IUD) placement [Time out performed] : Pre-procedure time out performed.  Patient's name, date of birth and procedure confirmed. [Consent Obtained] : Consent obtained [Prevention of Pregnancy] : prevention of pregnancy [Risks] : risks [Patient] : patient [Infection] : infection [Bleeding] : bleeding [Pain] : pain [Expulsion] : expulsion [Failure] : failure [Uterine Perforation] : uterine perforation [Neg Pregnancy Test] : negative pregnancy test [Neg GC/Chlamydia] : negative GC/Chlamydia [History of Unprotected [place]o] : no history of unprotected intercourse [No Premedication] : No premedication [Tenaculum] : Tenaculum [Easy Passage] : Easy passage [Sounded to ___ cm] : sounded to [unfilled] ~Ucm [Post Placement Transvag. US] : post placement transvaginal ultrasound [ParaGard IUD] : ParaGard IUD [Tolerated Well] : Patient tolerated the procedure well [No Complications] : No complications [None] : None [de-identified] : [id number] [de-identified] : 7 [de-identified] : 12/2032

## 2022-12-07 ENCOUNTER — NON-APPOINTMENT (OUTPATIENT)
Age: 32
End: 2022-12-07

## 2022-12-07 LAB
GLUCOSE 2H P MEAL SERPL-MCNC: 230 MG/DL
GLUCOSE BS SERPL-MCNC: 158 MG/DL

## 2022-12-09 DIAGNOSIS — Z30.430 ENCOUNTER FOR INSERTION OF INTRAUTERINE CONTRACEPTIVE DEVICE: ICD-10-CM

## 2022-12-09 DIAGNOSIS — R03.0 ELEVATED BLOOD-PRESSURE READING, WITHOUT DIAGNOSIS OF HYPERTENSION: ICD-10-CM

## 2023-02-06 ENCOUNTER — APPOINTMENT (OUTPATIENT)
Dept: OBGYN | Facility: CLINIC | Age: 33
End: 2023-02-06

## 2023-05-19 NOTE — PATIENT PROFILE OB - NS PRO DEPRESSION SCREENING Y/N1
no
PAST SURGICAL HISTORY:  H/O plastic surgery tummy and liposuction 7 months ago    History of bilateral inguinal hernias     S/P arthroscopy of left shoulder

## 2023-09-08 NOTE — DISCHARGE NOTE OB - NS AS DC AMI YN
Reached out to case kellie regarding O2 and it is unfortunately still pending.   Nursing was going have her daughter pick her up at 1930 after her shift. However she was sent to the ER for chest pain whole at work and now we do not have 02 more a ride. I have been in communication with the C[facility] on my floor and in the ED.   --patient is otherwise fully discharged but may have to stay the night due to th logistics of oxygen and transportation.      no

## 2024-09-05 ENCOUNTER — APPOINTMENT (OUTPATIENT)
Dept: OBGYN | Facility: CLINIC | Age: 34
End: 2024-09-05
Payer: MEDICAID

## 2024-09-05 ENCOUNTER — OUTPATIENT (OUTPATIENT)
Dept: OUTPATIENT SERVICES | Facility: HOSPITAL | Age: 34
LOS: 1 days | End: 2024-09-05
Payer: MEDICAID

## 2024-09-05 VITALS
WEIGHT: 175 LBS | BODY MASS INDEX: 26.52 KG/M2 | SYSTOLIC BLOOD PRESSURE: 145 MMHG | TEMPERATURE: 98.5 F | HEART RATE: 69 BPM | HEIGHT: 68 IN | DIASTOLIC BLOOD PRESSURE: 90 MMHG | OXYGEN SATURATION: 100 %

## 2024-09-05 VITALS — SYSTOLIC BLOOD PRESSURE: 127 MMHG | DIASTOLIC BLOOD PRESSURE: 84 MMHG

## 2024-09-05 DIAGNOSIS — Z00.00 ENCOUNTER FOR GENERAL ADULT MEDICAL EXAMINATION WITHOUT ABNORMAL FINDINGS: ICD-10-CM

## 2024-09-05 DIAGNOSIS — Z01.419 ENCOUNTER FOR GYNECOLOGICAL EXAMINATION (GENERAL) (ROUTINE) W/OUT ABNORMAL FINDINGS: ICD-10-CM

## 2024-09-05 PROCEDURE — 99213 OFFICE O/P EST LOW 20 MIN: CPT

## 2024-09-05 PROCEDURE — 87591 N.GONORRHOEAE DNA AMP PROB: CPT

## 2024-09-05 PROCEDURE — 87491 CHLMYD TRACH DNA AMP PROBE: CPT

## 2024-09-05 PROCEDURE — 87625 HPV TYPES 16 & 18 ONLY: CPT

## 2024-09-05 PROCEDURE — G0463: CPT

## 2024-09-05 NOTE — HISTORY OF PRESENT ILLNESS
[FreeTextEntry1] : CC: Requests gyn checkup and  iud string check. Reg menses, no c/o. HPI: Paragard IUD  since 12/2022

## 2024-09-05 NOTE — PHYSICAL EXAM
[Appropriately responsive] : appropriately responsive [Alert] : alert [No Acute Distress] : no acute distress [No Lymphadenopathy] : no lymphadenopathy [No Murmurs] : no murmurs [Soft] : soft [Non-tender] : non-tender [Non-distended] : non-distended [No HSM] : No HSM [No Lesions] : no lesions [No Mass] : no mass [Oriented x3] : oriented x3 [Examination Of The Breasts] : a normal appearance [No Masses] : no breast masses were palpable [Labia Majora] : normal [Labia Minora] : normal [IUD String] : an IUD string was noted [Normal] : normal [Uterine Adnexae] : normal

## 2024-09-09 LAB
C TRACH RRNA SPEC QL NAA+PROBE: NOT DETECTED
CYTOLOGY CVX/VAG DOC THIN PREP: ABNORMAL
HPV 16 E6+E7 MRNA CVX QL NAA+PROBE: NOT DETECTED
HPV18+45 E6+E7 MRNA CVX QL NAA+PROBE: NOT DETECTED
N GONORRHOEA RRNA SPEC QL NAA+PROBE: NOT DETECTED
SOURCE TP AMPLIFICATION: NORMAL

## 2024-09-10 DIAGNOSIS — Z01.419 ENCOUNTER FOR GYNECOLOGICAL EXAMINATION (GENERAL) (ROUTINE) WITHOUT ABNORMAL FINDINGS: ICD-10-CM
